# Patient Record
Sex: FEMALE | Race: WHITE | NOT HISPANIC OR LATINO | Employment: UNEMPLOYED | URBAN - NONMETROPOLITAN AREA
[De-identification: names, ages, dates, MRNs, and addresses within clinical notes are randomized per-mention and may not be internally consistent; named-entity substitution may affect disease eponyms.]

---

## 2017-01-20 RX ORDER — GABAPENTIN 400 MG/1
400 CAPSULE ORAL 3 TIMES DAILY
Qty: 90 CAPSULE | Refills: 2 | Status: SHIPPED | OUTPATIENT
Start: 2017-01-20 | End: 2017-04-11 | Stop reason: SDUPTHER

## 2017-01-20 RX ORDER — PANTOPRAZOLE SODIUM 40 MG/1
40 TABLET, DELAYED RELEASE ORAL
Qty: 30 TABLET | Refills: 6 | Status: SHIPPED | OUTPATIENT
Start: 2017-01-20 | End: 2017-06-06 | Stop reason: SDUPTHER

## 2017-02-10 RX ORDER — NYSTATIN 100000 [USP'U]/G
POWDER TOPICAL
Qty: 60 G | Refills: 3 | Status: ON HOLD | OUTPATIENT
Start: 2017-02-10 | End: 2022-09-02

## 2017-04-11 RX ORDER — GABAPENTIN 400 MG/1
CAPSULE ORAL
Qty: 90 CAPSULE | Refills: 2 | Status: SHIPPED | OUTPATIENT
Start: 2017-04-11 | End: 2017-06-06 | Stop reason: SDUPTHER

## 2017-05-08 RX ORDER — LISINOPRIL AND HYDROCHLOROTHIAZIDE 20; 12.5 MG/1; MG/1
TABLET ORAL
Qty: 90 TABLET | Refills: 3 | Status: SHIPPED | OUTPATIENT
Start: 2017-05-08 | End: 2017-06-06 | Stop reason: SDUPTHER

## 2017-06-06 ENCOUNTER — OFFICE VISIT (OUTPATIENT)
Dept: FAMILY MEDICINE CLINIC | Facility: CLINIC | Age: 57
End: 2017-06-06

## 2017-06-06 VITALS
WEIGHT: 236 LBS | HEART RATE: 65 BPM | BODY MASS INDEX: 40.51 KG/M2 | DIASTOLIC BLOOD PRESSURE: 76 MMHG | SYSTOLIC BLOOD PRESSURE: 129 MMHG | OXYGEN SATURATION: 95 % | TEMPERATURE: 97.5 F | RESPIRATION RATE: 16 BRPM

## 2017-06-06 DIAGNOSIS — I10 BENIGN ESSENTIAL HYPERTENSION: Primary | ICD-10-CM

## 2017-06-06 DIAGNOSIS — K21.00 GASTROESOPHAGEAL REFLUX DISEASE WITH ESOPHAGITIS: ICD-10-CM

## 2017-06-06 DIAGNOSIS — E78.00 PURE HYPERCHOLESTEROLEMIA: ICD-10-CM

## 2017-06-06 DIAGNOSIS — M79.2 NEURALGIA: ICD-10-CM

## 2017-06-06 PROCEDURE — 99214 OFFICE O/P EST MOD 30 MIN: CPT | Performed by: INTERNAL MEDICINE

## 2017-06-06 RX ORDER — GABAPENTIN 400 MG/1
400 CAPSULE ORAL 3 TIMES DAILY
Qty: 90 CAPSULE | Refills: 0 | Status: SHIPPED | OUTPATIENT
Start: 2017-06-06 | End: 2021-05-31 | Stop reason: HOSPADM

## 2017-06-06 RX ORDER — LISINOPRIL AND HYDROCHLOROTHIAZIDE 20; 12.5 MG/1; MG/1
1 TABLET ORAL DAILY
Qty: 90 TABLET | Refills: 3 | Status: SHIPPED | OUTPATIENT
Start: 2017-06-06 | End: 2018-09-21 | Stop reason: SDUPTHER

## 2017-06-06 RX ORDER — PANTOPRAZOLE SODIUM 40 MG/1
40 TABLET, DELAYED RELEASE ORAL
Qty: 30 TABLET | Refills: 6 | Status: SHIPPED | OUTPATIENT
Start: 2017-06-06 | End: 2021-03-12 | Stop reason: SDUPTHER

## 2017-06-06 RX ORDER — PRAVASTATIN SODIUM 80 MG/1
80 TABLET ORAL DAILY
Qty: 90 TABLET | Refills: 3 | Status: ON HOLD | OUTPATIENT
Start: 2017-06-06 | End: 2021-03-08 | Stop reason: SDUPTHER

## 2017-06-06 NOTE — PROGRESS NOTES
Subjective   Cait Lu is a 56 y.o. female.     Chief Complaint   Patient presents with   • Hyperlipidemia   • Hypertension   • Numbness   • Heartburn     Hpi:  Patient is here to follow-up on the blood pressure. She will been taking medications as prescribed. She is due for blood work. She is also here to follow up on stable reflux and neuropathy , she take gabapentin 400mg po tid  Hyperlipidemia   Pertinent negatives include no chest pain or shortness of breath.   Hypertension   Pertinent negatives include no chest pain, headaches, palpitations or shortness of breath.      The following portions of the patient's history were reviewed and updated as appropriate: allergies, current medications, past family history, past medical history, past social history, past surgical history and problem list.    Review of Systems   Constitutional: Negative for appetite change, fatigue and fever.   HENT: Negative for congestion, ear discharge, ear pain, sinus pressure and sore throat.    Eyes: Negative for pain and discharge.   Respiratory: Negative for cough, chest tightness, shortness of breath and wheezing.    Cardiovascular: Negative for chest pain, palpitations and leg swelling.   Gastrointestinal: Negative for abdominal pain, blood in stool, constipation, diarrhea and nausea.        Stable reflux   Endocrine: Negative for cold intolerance and heat intolerance.   Genitourinary: Negative for dysuria, flank pain and frequency.   Musculoskeletal: Negative for back pain and joint swelling.   Skin: Negative for color change.   Allergic/Immunologic: Negative for environmental allergies and food allergies.   Neurological: Positive for numbness. Negative for dizziness, weakness and headaches.   Hematological: Negative for adenopathy. Does not bruise/bleed easily.   Psychiatric/Behavioral: Negative for behavioral problems and dysphoric mood. The patient is not nervous/anxious.          Current Outpatient Prescriptions:    •  albuterol (PROVENTIL HFA;VENTOLIN HFA) 108 (90 BASE) MCG/ACT inhaler, Ventolin  (90 Base) MCG/ACT Inhalation Aerosol Solution; Patient Sig: Ventolin  (90 Base) MCG/ACT Inhalation Aerosol Solution INHALE 1 TO 2 PUFFS EVERY 4 TO 6 HOURS AS NEEDED.; 18; 2; 28-Apr-2014; Active, Disp: , Rfl:   •  buprenorphine-naloxone (SUBOXONE) 8-2 MG film film, Place  under the tongue., Disp: , Rfl:   •  gabapentin (NEURONTIN) 400 MG capsule, Take 1 capsule by mouth 3 (Three) Times a Day., Disp: 90 capsule, Rfl: 0  •  lisinopril-hydrochlorothiazide (PRINZIDE,ZESTORETIC) 20-12.5 MG per tablet, Take 1 tablet by mouth Daily., Disp: 90 tablet, Rfl: 3  •  nystatin (NYSTOP) 308953 UNIT/GM powder powder, APPLY 2-3 TIMES DAILY TO AFFECTED AREA(S)., Disp: 60 g, Rfl: 3  •  pantoprazole (PROTONIX) 40 MG EC tablet, Take 1 tablet by mouth Every Morning Before Breakfast. 30 minutes before breakfast, Disp: 30 tablet, Rfl: 6  •  pravastatin (PRAVACHOL) 80 MG tablet, Take 1 tablet by mouth Daily., Disp: 90 tablet, Rfl: 3  •  promethazine (PHENERGAN) 12.5 MG tablet, Take 1 tablet by mouth every 12 (twelve) hours as needed for nausea or vomiting., Disp: 60 tablet, Rfl: 1    Objective     Blood pressure 129/76, pulse 65, temperature 97.5 °F (36.4 °C), temperature source Oral, resp. rate 16, weight 236 lb (107 kg), SpO2 95 %.    Physical Exam   Constitutional: She is oriented to person, place, and time. She appears well-developed and well-nourished. No distress.   HENT:   Head: Normocephalic and atraumatic.   Right Ear: External ear normal.   Left Ear: External ear normal.   Nose: Right sinus exhibits maxillary sinus tenderness. Left sinus exhibits maxillary sinus tenderness.   Mouth/Throat: Oropharyngeal exudate present.   Eyes: Conjunctivae and EOM are normal. Pupils are equal, round, and reactive to light.   Neck: Neck supple. No thyromegaly present.   Cardiovascular: Normal rate, regular rhythm and normal heart sounds.     Pulmonary/Chest: Effort normal and breath sounds normal. No respiratory distress.   Abdominal: Soft. Bowel sounds are normal. She exhibits no distension. There is no tenderness. There is no rebound.   Musculoskeletal: Normal range of motion. She exhibits no edema.   Lymphadenopathy:     She has no cervical adenopathy.   Neurological: She is alert and oriented to person, place, and time.   No gross motor or sensory deficits   Skin: Skin is warm. She is not diaphoretic.   Psychiatric: She has a normal mood and affect.   Nursing note and vitals reviewed.      Results for orders placed or performed during the hospital encounter of 10/31/16   Comprehensive Metabolic Panel   Result Value Ref Range    Sodium 142 137 - 145 mmol/L    Potassium 4.1 3.5 - 5.1 mmol/L    Chloride 99 98 - 107 mmol/L    CO2 34 (H) 26 - 30 mmol/L    Anion Gap 13 10 - 20 mmol/L    BUN 14 7 - 20 mg/dL    Creatinine 0.9 0.6 - 1.3 mg/dL    BUN/Creatinine Ratio 15.0 7.1 - 23.5    eGFR 65 mL/min    Glucose 97 74 - 98 mg/dL    Calcium 9.5 8.4 - 10.2 mg/dL    Total Bilirubin 0.6 0.2 - 1.3 mg/dL    AST (SGOT) 20 15 - 46 U/L    ALT (SGPT) 26 13 - 69 U/L    Total Protein 8.1 6.3 - 8.2 g/dL    Albumin 4.1 3.5 - 5.0 g/dL    A/G Ratio 1.1 1.0 - 2.0    Alkaline Phosphatase 106 38 - 126 U/L   CK   Result Value Ref Range    Creatine Kinase 31 30 - 170 U/L   Lipid Panel   Result Value Ref Range    Total Cholesterol 173 0 - 199 mg/dL    Triglycerides 157 (H) 0 - 149 mg/dL    HDL Cholesterol 51 40 - 60 mg/dL    LDL Cholesterol  91 0 - 99 mg/dL    VLDL Cholesterol 31 mg/dL   CBC & Differential   Result Value Ref Range    WBC 7.5 4.8 - 10.8 THOUS    RBC 4.45 4.20 - 5.40 m/uL    Hemoglobin 12.9 12.0 - 16.0 g/dL    Hematocrit 41 37 - 47 %    MCV 91.0 81.0 - 99.0 fL    MCH 29.0 27.0 - 31.0 uug    MCHC 31.9 30.0 - 37.0 g/dL    RDW 12.4 11.5 - 14.5 %    Platelets 245 130 - 400 THOUS    Neutrophil Rel % 60.3 37.0 - 80.0 %    Lymphocyte Rel % 27.8 10.0 - 50.0 %    Monocyte  Rel % 5.6 0.0 - 12.0 %    Eosinophil Rel % 5.0 0.0 - 7.0 %    Basophil Rel % 0.90 0.00 - 2.50 %    Immature Granulocyte Rel % 0.40 0.00 - 2.50 %    Neutrophils Absolute 4.54 2.00 - 6.90 THOUS    Lymphocytes Absolute 2.09 0.60 - 3.40 THOUS    Monocytes Absolute 0.42 0.00 - 0.90 THOUS    Eosinophils Absolute 0.38 0.00 - 0.70 THOUS    Basophils Absolute 0.07 0.00 - 0.20 THOUS    Abs Imm Gran 0.03 0.00 - 0.60 THOUS         Assessment/Plan   Cait was seen today for hyperlipidemia, hypertension, numbness and heartburn.    Diagnoses and all orders for this visit:    Benign essential hypertension    Pure hypercholesterolemia    Gastroesophageal reflux disease with esophagitis    Neuralgia    Other orders  -     gabapentin (NEURONTIN) 400 MG capsule; Take 1 capsule by mouth 3 (Three) Times a Day.  -     lisinopril-hydrochlorothiazide (PRINZIDE,ZESTORETIC) 20-12.5 MG per tablet; Take 1 tablet by mouth Daily.  -     pantoprazole (PROTONIX) 40 MG EC tablet; Take 1 tablet by mouth Every Morning Before Breakfast. 30 minutes before breakfast  -     pravastatin (PRAVACHOL) 80 MG tablet; Take 1 tablet by mouth Daily.        Plan:  1. Benign essential hypertension: Will continue current medication, low-sodium diet advise.   2.mixed hyperlipidemia: Will refill medication. Patient advised to obtain fasting CMP and lipid panel. Diet and excise counseled  3. Neuralgia: We will change Gabapentin to 400 mg every 8 hours, once we get the lab results   4. gerd : continue pantoprazole         Janie Gudino MD

## 2017-08-22 RX ORDER — PRAVASTATIN SODIUM 80 MG/1
TABLET ORAL
Qty: 90 TABLET | Refills: 3 | Status: SHIPPED | OUTPATIENT
Start: 2017-08-22 | End: 2021-03-12 | Stop reason: SDUPTHER

## 2018-09-21 RX ORDER — LISINOPRIL AND HYDROCHLOROTHIAZIDE 20; 12.5 MG/1; MG/1
1 TABLET ORAL DAILY
Qty: 30 TABLET | Refills: 0 | Status: SHIPPED | OUTPATIENT
Start: 2018-09-21 | End: 2021-03-12 | Stop reason: SDUPTHER

## 2018-11-28 RX ORDER — PANTOPRAZOLE SODIUM 40 MG/1
TABLET, DELAYED RELEASE ORAL
Qty: 30 TABLET | Refills: 6 | OUTPATIENT
Start: 2018-11-28

## 2019-04-25 RX ORDER — THERMOMETER, ELECTRONIC,ORAL
EACH MISCELLANEOUS
Qty: 2551.5 G | Refills: 6 | OUTPATIENT
Start: 2019-04-25

## 2019-04-25 RX ORDER — NYSTATIN 100000 [USP'U]/G
POWDER TOPICAL
Qty: 60 G | Refills: 3 | OUTPATIENT
Start: 2019-04-25

## 2021-03-07 ENCOUNTER — HOSPITAL ENCOUNTER (INPATIENT)
Facility: HOSPITAL | Age: 61
LOS: 4 days | Discharge: HOME | End: 2021-03-11
Attending: INTERNAL MEDICINE | Admitting: INTERNAL MEDICINE

## 2021-03-07 ENCOUNTER — APPOINTMENT (OUTPATIENT)
Dept: CT IMAGING | Facility: HOSPITAL | Age: 61
End: 2021-03-07

## 2021-03-07 ENCOUNTER — APPOINTMENT (OUTPATIENT)
Dept: GENERAL RADIOLOGY | Facility: HOSPITAL | Age: 61
End: 2021-03-07

## 2021-03-07 DIAGNOSIS — F11.20 UNCOMPLICATED OPIOID DEPENDENCE (HCC): ICD-10-CM

## 2021-03-07 DIAGNOSIS — N17.9 ACUTE KIDNEY INJURY (HCC): Primary | ICD-10-CM

## 2021-03-07 DIAGNOSIS — J44.1 ACUTE EXACERBATION OF CHRONIC OBSTRUCTIVE PULMONARY DISEASE (COPD) (HCC): ICD-10-CM

## 2021-03-07 DIAGNOSIS — F19.10 POLYSUBSTANCE ABUSE (HCC): ICD-10-CM

## 2021-03-07 DIAGNOSIS — N28.9 RENAL INSUFFICIENCY: ICD-10-CM

## 2021-03-07 DIAGNOSIS — R41.0 DELIRIUM: ICD-10-CM

## 2021-03-07 DIAGNOSIS — F15.10 METHAMPHETAMINE ABUSE (HCC): ICD-10-CM

## 2021-03-07 LAB
ALBUMIN SERPL-MCNC: 4.1 G/DL (ref 3.5–5.2)
ALBUMIN/GLOB SERPL: 1.2 G/DL
ALP SERPL-CCNC: 119 U/L (ref 39–117)
ALT SERPL W P-5'-P-CCNC: 13 U/L (ref 1–33)
AMMONIA BLD-SCNC: 52 UMOL/L (ref 11–51)
AMPHET+METHAMPHET UR QL: POSITIVE
AMPHETAMINES UR QL: POSITIVE
ANION GAP SERPL CALCULATED.3IONS-SCNC: 16.2 MMOL/L (ref 5–15)
AST SERPL-CCNC: 20 U/L (ref 1–32)
ATMOSPHERIC PRESS: 741 MMHG
BARBITURATES UR QL SCN: NEGATIVE
BASE EXCESS BLDV CALC-SCNC: -5.2 MMOL/L (ref 0–2)
BASOPHILS # BLD AUTO: 0.06 10*3/MM3 (ref 0–0.2)
BASOPHILS NFR BLD AUTO: 0.5 % (ref 0–1.5)
BDY SITE: ABNORMAL
BENZODIAZ UR QL SCN: NEGATIVE
BILIRUB SERPL-MCNC: 0.3 MG/DL (ref 0–1.2)
BILIRUB UR QL STRIP: NEGATIVE
BUN SERPL-MCNC: 77 MG/DL (ref 8–23)
BUN/CREAT SERPL: 14.5 (ref 7–25)
BUPRENORPHINE SERPL-MCNC: POSITIVE NG/ML
CALCIUM SPEC-SCNC: 9.2 MG/DL (ref 8.6–10.5)
CANNABINOIDS SERPL QL: NEGATIVE
CHLORIDE SERPL-SCNC: 101 MMOL/L (ref 98–107)
CK SERPL-CCNC: 337 U/L (ref 20–180)
CLARITY UR: CLEAR
CO2 SERPL-SCNC: 18.8 MMOL/L (ref 22–29)
COCAINE UR QL: NEGATIVE
COHGB MFR BLD: 1.5 % (ref 0–5)
COLOR UR: YELLOW
CREAT SERPL-MCNC: 5.3 MG/DL (ref 0.57–1)
CREAT UR-MCNC: 56.2 MG/DL
DEPRECATED RDW RBC AUTO: 42.9 FL (ref 37–54)
EOSINOPHIL # BLD AUTO: 0.16 10*3/MM3 (ref 0–0.4)
EOSINOPHIL NFR BLD AUTO: 1.4 % (ref 0.3–6.2)
ERYTHROCYTE [DISTWIDTH] IN BLOOD BY AUTOMATED COUNT: 13.1 % (ref 12.3–15.4)
ETHANOL BLD-MCNC: <10 MG/DL (ref 0–10)
ETHANOL UR QL: <0.01 %
GFR SERPL CREATININE-BSD FRML MDRD: 8 ML/MIN/1.73
GFR SERPL CREATININE-BSD FRML MDRD: ABNORMAL ML/MIN/{1.73_M2}
GLOBULIN UR ELPH-MCNC: 3.5 GM/DL
GLUCOSE SERPL-MCNC: 92 MG/DL (ref 65–99)
GLUCOSE UR STRIP-MCNC: NEGATIVE MG/DL
HCO3 BLDV-SCNC: 22 MMOL/L (ref 22–28)
HCT VFR BLD AUTO: 39.9 % (ref 34–46.6)
HGB BLD-MCNC: 13.3 G/DL (ref 12–15.9)
HGB UR QL STRIP.AUTO: NEGATIVE
HOLD SPECIMEN: NORMAL
IMM GRANULOCYTES # BLD AUTO: 0.06 10*3/MM3 (ref 0–0.05)
IMM GRANULOCYTES NFR BLD AUTO: 0.5 % (ref 0–0.5)
INHALED O2 CONCENTRATION: 21 %
KETONES UR QL STRIP: NEGATIVE
LEUKOCYTE ESTERASE UR QL STRIP.AUTO: NEGATIVE
LYMPHOCYTES # BLD AUTO: 1.26 10*3/MM3 (ref 0.7–3.1)
LYMPHOCYTES NFR BLD AUTO: 11.2 % (ref 19.6–45.3)
MCH RBC QN AUTO: 29.9 PG (ref 26.6–33)
MCHC RBC AUTO-ENTMCNC: 33.3 G/DL (ref 31.5–35.7)
MCV RBC AUTO: 89.7 FL (ref 79–97)
METHADONE UR QL SCN: NEGATIVE
METHGB BLD QL: 0.6 % (ref 0–3)
MODALITY: ABNORMAL
MONOCYTES # BLD AUTO: 0.6 10*3/MM3 (ref 0.1–0.9)
MONOCYTES NFR BLD AUTO: 5.3 % (ref 5–12)
MYOGLOBIN SERPL-MCNC: 179.9 NG/ML (ref 25–58)
NEUTROPHILS NFR BLD AUTO: 81.1 % (ref 42.7–76)
NEUTROPHILS NFR BLD AUTO: 9.13 10*3/MM3 (ref 1.7–7)
NITRITE UR QL STRIP: NEGATIVE
NOTE: ABNORMAL
NRBC BLD AUTO-RTO: 0 /100 WBC (ref 0–0.2)
OPIATES UR QL: NEGATIVE
OXYCODONE UR QL SCN: NEGATIVE
OXYHGB MFR BLDV: 64.4 % (ref 40–70)
PCO2 BLDV: 48.3 MM HG (ref 40–50)
PCP UR QL SCN: NEGATIVE
PH BLDV: 7.27 PH UNITS (ref 7.32–7.42)
PH UR STRIP.AUTO: <=5 [PH] (ref 5–8)
PLAT MORPH BLD: NORMAL
PLATELET # BLD AUTO: ABNORMAL 10*3/UL
PMV BLD AUTO: 10.3 FL (ref 6–12)
PO2 BLDV: 36.7 MM HG (ref 30–50)
POTASSIUM SERPL-SCNC: 5.4 MMOL/L (ref 3.5–5.2)
PROPOXYPH UR QL: NEGATIVE
PROT SERPL-MCNC: 7.6 G/DL (ref 6–8.5)
PROT UR QL STRIP: ABNORMAL
RBC # BLD AUTO: 4.45 10*6/MM3 (ref 3.77–5.28)
RBC MORPH BLD: NORMAL
SAO2 % BLDCOV: 65.8 % (ref 45–75)
SARS-COV-2 RNA PNL SPEC NAA+PROBE: NOT DETECTED
SMALL PLATELETS BLD QL SMEAR: NORMAL
SODIUM SERPL-SCNC: 136 MMOL/L (ref 136–145)
SODIUM UR-SCNC: 70 MMOL/L
SP GR UR STRIP: 1.01 (ref 1–1.03)
TRICYCLICS UR QL SCN: POSITIVE
TROPONIN T SERPL-MCNC: 0.03 NG/ML (ref 0–0.03)
UROBILINOGEN UR QL STRIP: ABNORMAL
VENTILATOR MODE: ABNORMAL
WBC # BLD AUTO: 11.27 10*3/MM3 (ref 3.4–10.8)
WBC MORPH BLD: NORMAL
WHOLE BLOOD HOLD SPECIMEN: NORMAL

## 2021-03-07 PROCEDURE — 71045 X-RAY EXAM CHEST 1 VIEW: CPT

## 2021-03-07 PROCEDURE — 87635 SARS-COV-2 COVID-19 AMP PRB: CPT | Performed by: EMERGENCY MEDICINE

## 2021-03-07 PROCEDURE — 81003 URINALYSIS AUTO W/O SCOPE: CPT | Performed by: EMERGENCY MEDICINE

## 2021-03-07 PROCEDURE — 70450 CT HEAD/BRAIN W/O DYE: CPT

## 2021-03-07 PROCEDURE — 80053 COMPREHEN METABOLIC PANEL: CPT | Performed by: EMERGENCY MEDICINE

## 2021-03-07 PROCEDURE — P9612 CATHETERIZE FOR URINE SPEC: HCPCS

## 2021-03-07 PROCEDURE — G0378 HOSPITAL OBSERVATION PER HR: HCPCS

## 2021-03-07 PROCEDURE — 80306 DRUG TEST PRSMV INSTRMNT: CPT | Performed by: EMERGENCY MEDICINE

## 2021-03-07 PROCEDURE — 84484 ASSAY OF TROPONIN QUANT: CPT | Performed by: EMERGENCY MEDICINE

## 2021-03-07 PROCEDURE — 99284 EMERGENCY DEPT VISIT MOD MDM: CPT

## 2021-03-07 PROCEDURE — 02HV33Z INSERTION OF INFUSION DEVICE INTO SUPERIOR VENA CAVA, PERCUTANEOUS APPROACH: ICD-10-PCS | Performed by: EMERGENCY MEDICINE

## 2021-03-07 PROCEDURE — 82805 BLOOD GASES W/O2 SATURATION: CPT

## 2021-03-07 PROCEDURE — 82570 ASSAY OF URINE CREATININE: CPT | Performed by: FAMILY MEDICINE

## 2021-03-07 PROCEDURE — C1751 CATH, INF, PER/CENT/MIDLINE: HCPCS

## 2021-03-07 PROCEDURE — 82820 HEMOGLOBIN-OXYGEN AFFINITY: CPT

## 2021-03-07 PROCEDURE — 25010000002 HEPARIN (PORCINE) PER 1000 UNITS: Performed by: FAMILY MEDICINE

## 2021-03-07 PROCEDURE — B548ZZA ULTRASONOGRAPHY OF SUPERIOR VENA CAVA, GUIDANCE: ICD-10-PCS | Performed by: EMERGENCY MEDICINE

## 2021-03-07 PROCEDURE — 82550 ASSAY OF CK (CPK): CPT | Performed by: EMERGENCY MEDICINE

## 2021-03-07 PROCEDURE — 82077 ASSAY SPEC XCP UR&BREATH IA: CPT | Performed by: EMERGENCY MEDICINE

## 2021-03-07 PROCEDURE — 85007 BL SMEAR W/DIFF WBC COUNT: CPT | Performed by: EMERGENCY MEDICINE

## 2021-03-07 PROCEDURE — 93005 ELECTROCARDIOGRAM TRACING: CPT | Performed by: EMERGENCY MEDICINE

## 2021-03-07 PROCEDURE — 84300 ASSAY OF URINE SODIUM: CPT | Performed by: FAMILY MEDICINE

## 2021-03-07 PROCEDURE — 99223 1ST HOSP IP/OBS HIGH 75: CPT | Performed by: FAMILY MEDICINE

## 2021-03-07 PROCEDURE — 82140 ASSAY OF AMMONIA: CPT | Performed by: FAMILY MEDICINE

## 2021-03-07 PROCEDURE — 83874 ASSAY OF MYOGLOBIN: CPT | Performed by: EMERGENCY MEDICINE

## 2021-03-07 PROCEDURE — 85025 COMPLETE CBC W/AUTO DIFF WBC: CPT | Performed by: EMERGENCY MEDICINE

## 2021-03-07 PROCEDURE — 36415 COLL VENOUS BLD VENIPUNCTURE: CPT

## 2021-03-07 RX ORDER — SODIUM CHLORIDE 0.9 % (FLUSH) 0.9 %
10 SYRINGE (ML) INJECTION AS NEEDED
Status: DISCONTINUED | OUTPATIENT
Start: 2021-03-07 | End: 2021-03-11 | Stop reason: HOSPADM

## 2021-03-07 RX ORDER — ACETAMINOPHEN 160 MG/5ML
650 SOLUTION ORAL EVERY 4 HOURS PRN
Status: DISCONTINUED | OUTPATIENT
Start: 2021-03-07 | End: 2021-03-11 | Stop reason: HOSPADM

## 2021-03-07 RX ORDER — LORAZEPAM 2 MG/ML
0.5 INJECTION INTRAMUSCULAR EVERY 4 HOURS PRN
Status: DISCONTINUED | OUTPATIENT
Start: 2021-03-07 | End: 2021-03-11 | Stop reason: HOSPADM

## 2021-03-07 RX ORDER — HEPARIN SODIUM 5000 [USP'U]/ML
5000 INJECTION, SOLUTION INTRAVENOUS; SUBCUTANEOUS EVERY 12 HOURS SCHEDULED
Status: DISCONTINUED | OUTPATIENT
Start: 2021-03-07 | End: 2021-03-11 | Stop reason: HOSPADM

## 2021-03-07 RX ORDER — CYCLOBENZAPRINE HCL 10 MG
10 TABLET ORAL 3 TIMES DAILY PRN
Status: ON HOLD | COMMUNITY
End: 2022-09-02

## 2021-03-07 RX ORDER — ONDANSETRON 4 MG/1
4 TABLET, FILM COATED ORAL EVERY 6 HOURS PRN
Status: DISCONTINUED | OUTPATIENT
Start: 2021-03-07 | End: 2021-03-11 | Stop reason: HOSPADM

## 2021-03-07 RX ORDER — ONDANSETRON 2 MG/ML
4 INJECTION INTRAMUSCULAR; INTRAVENOUS EVERY 6 HOURS PRN
Status: DISCONTINUED | OUTPATIENT
Start: 2021-03-07 | End: 2021-03-11 | Stop reason: HOSPADM

## 2021-03-07 RX ORDER — SODIUM CHLORIDE 9 MG/ML
100 INJECTION, SOLUTION INTRAVENOUS CONTINUOUS
Status: DISCONTINUED | OUTPATIENT
Start: 2021-03-07 | End: 2021-03-08

## 2021-03-07 RX ORDER — ACETAMINOPHEN 650 MG/1
650 SUPPOSITORY RECTAL EVERY 4 HOURS PRN
Status: DISCONTINUED | OUTPATIENT
Start: 2021-03-07 | End: 2021-03-11 | Stop reason: HOSPADM

## 2021-03-07 RX ORDER — QUETIAPINE FUMARATE 300 MG/1
300 TABLET, FILM COATED ORAL NIGHTLY
COMMUNITY

## 2021-03-07 RX ORDER — LORAZEPAM 0.5 MG/1
0.5 TABLET ORAL EVERY 8 HOURS PRN
Status: DISCONTINUED | OUTPATIENT
Start: 2021-03-07 | End: 2021-03-11 | Stop reason: HOSPADM

## 2021-03-07 RX ORDER — ACETAMINOPHEN 325 MG/1
650 TABLET ORAL EVERY 4 HOURS PRN
Status: DISCONTINUED | OUTPATIENT
Start: 2021-03-07 | End: 2021-03-11 | Stop reason: HOSPADM

## 2021-03-07 RX ORDER — SODIUM CHLORIDE 0.9 % (FLUSH) 0.9 %
10 SYRINGE (ML) INJECTION EVERY 12 HOURS SCHEDULED
Status: DISCONTINUED | OUTPATIENT
Start: 2021-03-07 | End: 2021-03-11 | Stop reason: HOSPADM

## 2021-03-07 RX ADMIN — SODIUM CHLORIDE 1000 ML: 9 INJECTION, SOLUTION INTRAVENOUS at 05:32

## 2021-03-07 RX ADMIN — SODIUM CHLORIDE 100 ML/HR: 9 INJECTION, SOLUTION INTRAVENOUS at 20:18

## 2021-03-07 RX ADMIN — SODIUM CHLORIDE, PRESERVATIVE FREE 10 ML: 5 INJECTION INTRAVENOUS at 10:07

## 2021-03-07 RX ADMIN — HEPARIN SODIUM 5000 UNITS: 5000 INJECTION INTRAVENOUS; SUBCUTANEOUS at 20:17

## 2021-03-07 RX ADMIN — HEPARIN SODIUM 5000 UNITS: 5000 INJECTION INTRAVENOUS; SUBCUTANEOUS at 10:15

## 2021-03-07 RX ADMIN — SODIUM CHLORIDE, PRESERVATIVE FREE 10 ML: 5 INJECTION INTRAVENOUS at 10:18

## 2021-03-07 RX ADMIN — SODIUM CHLORIDE 100 ML/HR: 9 INJECTION, SOLUTION INTRAVENOUS at 10:06

## 2021-03-07 NOTE — ED PROVIDER NOTES
Subjective   History of Present Illness    Chief Complaint: Confusion, general weakness  History of Present Illness: 60-year-old female history of opiate dependence on Suboxone, here with reported generalized weakness and confusion, arrives by EMS.  Patient is a poor historian and no other family members are present.  States that she had been reportedly confused for most of the day and was too weak to get up to go to the bathroom tonight.  No reported falls or head injury.  No reported fever sick contacts.  Patient is otherwise a poor historian.  Had recent family members passed from COVID-19.  Onset: Yesterday all day  Duration: Persistent  Exacerbating / Alleviating factors: Unknown  Associated symptoms: Unknown      Nurses Notes reviewed and agree, including vitals, allergies, social history and prior medical history.     REVIEW OF SYSTEMS: All systems reviewed and not pertinent unless noted.    Positive for: Generalized weakness, too weak to stand, confusion    Negative for: Unable to obtain negative review of systems secondary patient status on arrival, patient denies any fever cough sick contacts travel denies illicit drug use  Review of Systems    Past Medical History:   Diagnosis Date   • Abdominal pain    • Acute bronchitis with bronchospasm    • Allergic rhinitis due to pollen    • Cellulitis    • Cutaneous candidiasis    • Edema    • Former smoker    • Hypercholesterolemia    • Hypertension    • Impaired functional mobility, balance, gait, and endurance    • Joint pain of ankle and foot    • Legally blind    • Low back pain    • Neuralgia    • Sebaceous cyst    • Toe joint pain    • Urinary tract infection, acute    • Vitamin D deficiency        No Known Allergies    History reviewed. No pertinent surgical history.    Family History   Problem Relation Age of Onset   • Cancer Mother         breast   • Diabetes Mother    • Early death Mother         age 73   • Heart disease Father    • Heart disease Other         Social History     Socioeconomic History   • Marital status:      Spouse name: Not on file   • Number of children: Not on file   • Years of education: Not on file   • Highest education level: Not on file   Tobacco Use   • Smoking status: Former Smoker   Substance and Sexual Activity   • Alcohol use: No   • Drug use: No           Objective   Physical Exam    GENERAL APPEARANCE: Well developed, obese 60-year-old white female,  in no acute distress.  VITAL SIGNS: per nursing, reviewed and noted  SKIN: exposed skin with no rashes, ulcerations or petechiae.  Head: Normocephalic, atraumatic.   EYES: perrla. EOMI.  ENT: Normal voice.  Patient maintained wearing a mask throughout patient encounter due to coronavirus pandemic  LUNGS:  No increased work of breathing. No retractions.   CARDIOVASCULAR:  regular rate and rhythm, no murmurs.  Good Peripheral pulses. Good cap refill to extremities.   ABDOMEN: Soft, nontender, normal bowel sounds. No hernia. No ascites.  MUSCULOSKELETAL:  No tenderness. Full ROM. Strength and tone normal.  NEUROLOGIC: Alert, seems slightly confused. No facial droop no focal weakness, no cerebellar signs, global strength 4 out of 5 in bilateral upper and lower extremities. GCS 14.   NECK: Supple, symmetric. No tenderness, no masses. Full ROM  Back: full rom, no paraspinal spasm. No CVA tenderness.   PSYCH: appropriate affect.  : no bladder tenderness or distention, no CVA tenderness      Critical Care  Performed by: Spike Aranda DO  Authorized by: Spike Aranda DO     Critical care provider statement:     Critical care time (minutes):  30    Critical care time was exclusive of:  Separately billable procedures and treating other patients    Critical care was necessary to treat or prevent imminent or life-threatening deterioration of the following conditions: Altered mental status TANYA.    Critical care was time spent personally by me on the following activities:  Development of  treatment plan with patient or surrogate, discussions with consultants, evaluation of patient's response to treatment, examination of patient, ordering and performing treatments and interventions, ordering and review of laboratory studies, ordering and review of radiographic studies, pulse oximetry, re-evaluation of patient's condition and review of old charts         No attending physician procedures were performed on this patient.      ED Course  ED Course as of Mar 09 1942   Sun Mar 07, 2021   0509 Methamphetamine, Ur(!): Positive [PF]   0509 Amphetamine, Urine Qual(!): Positive [PF]   0509 Tricyclic Antidepressants Screen(!): Positive [PF]   0509 COVID19: Not Detected [PF]   0509 Glucose: 92 [PF]   0509 BUN(!): 77 [PF]   0509 Creatinine(!): 5.30 [PF]   0509 Sodium: 136 [PF]   0509 Potassium(!): 5.4 [PF]   0509 Chloride: 101 [PF]   0509 CO2(!): 18.8 [PF]   0509 Calcium: 9.2 [PF]   0509 Total Protein: 7.6 [PF]   0509 Albumin: 4.10 [PF]   0509 ALT (SGPT): 13 [PF]   0509 AST (SGOT): 20 [PF]   0509 Alkaline Phosphatase(!): 119 [PF]   0509 Total Bilirubin: 0.3 [PF]   0509 eGFR Non  Am(!): 8 [PF]   0509 Troponin T: 0.027 [PF]   0509 RBC morphology: Normal [PF]   0509 WBC Morphology: Normal [PF]   0509 WBC(!): 11.27 [PF]   0509 Hemoglobin: 13.3 [PF]   0509 Hematocrit: 39.9 [PF]   0509 Myoglobin(!): 179.9 [PF]   0509 Ethanol: <10 [PF]   0510 Creatine Kinase(!): 337 [PF]   0510 pH, Venous(!): 7.267 [PF]   0510 pCO2, Venous: 48.3 [PF]   0510 HCO3, Venous: 22.0 [PF]   0510 Base Excess(!): -5.2 [PF]   0510 O2 Saturation, Venous: 65.8 [PF]   0510 Leukocytes, UA: Negative [PF]   0510 Nitrite, UA: Negative [PF]   0552 CT head.     Technique: Unenhanced CT from the vertex to the skull base. Coronal reformations.     Comparison: None     Findings:     Remote right periventricular/basal ganglia lacunar infarct.     Remote lacunar infarct left caudate head.     Gray-white differentiation is otherwise preserved without acute  territorial infarct.     No hemorrhage or mass.     Sulci and CSF-containing spaces are age-appropriate.     Paranasal sinuses and mastoid air cells are clear.     Calvarium and scalp are unremarkable.     IMPRESSION:  Impression:     1. No acute intracranial abnormality.     2. Remote lacunar infarcts as described above.     Authenticated by Valentín Ramos MD on 03/07/2021 05:12:48 AM    [PF]   0552 Chest x-ray interpreted by me shows no evidence of any cardiomegaly, effusion, infiltrate, or bony abnormality.    [PF]   0607 EKG interpreted by me reveals sinus rhythm rate of 82 nonspecific T wave changes.  No ectopy no ischemic change.    [PF]      ED Course User Index  [PF] Spike Aranda, DO                                           MDM  Number of Diagnoses or Management Options     Amount and/or Complexity of Data Reviewed  Clinical lab tests: reviewed  Tests in the radiology section of CPT®: reviewed  Independent visualization of images, tracings, or specimens: yes      60-year-old female presents with generalized weakness, too weak to stand.  Work-up consistent with elevated BUN and creatinine 77 and  5.3, pH 7.267.  No obvious infectious process.  Covid negative.  CT scan of the head reveals old lacunar infarcts, no acute findings on chest x-ray, no old renal function for comparison over 5 years.  Patient denies active methamphetamine use but states she is used in the past.     Patient stated she did not want to stay but given her confusion  I feel the patient does not have the medical decision capability to refuse care at this time.  Initiated IV fluids.  Will admit the patient for renal insufficiency and generalized weakness with confusion.    I discussed with Dr. Moe, will admit  Final diagnoses:   Renal insufficiency   Delirium   Uncomplicated opioid dependence (CMS/[facility])   Methamphetamine abuse (CMS/[facility])            Spike Aranda,   03/07/21 0607       Spike Aranda,   03/09/21 1942

## 2021-03-07 NOTE — PLAN OF CARE
Goal Outcome Evaluation:  Plan of Care Reviewed With: patient  Progress: no change  Outcome Summary: Pt arrived today from ED. New onset tremors and loss of muscle control. Pt appears confused but can answer questions with a pause. Incontinent BB with reduced oral intake. CVA ruled out prior to admission. Pt admits to recreational drug use and recent loss of close family

## 2021-03-07 NOTE — H&P
[facility]   HISTORY AND PHYSICAL      Name:  Cait Lu   Age:  60 y.o.  Sex:  female  :  1960  MRN:  [id number]   Visit Number:  [id number]  Admission Date:  3/7/2021  Date Of Service:  21  Primary Care Physician:  Janie Gudino MD    Chief Complaint:     Confusion    History Of Presenting Illness:      The patient is a 60-year-old female who had presented from home via EMS for apparent weakness and confusion.  Medical history obtained from the chart includes obesity, hypertension, hyperlipidemia, former tobacco abuse, chronic opioid dependency on Suboxone, history of bronchitis.  History is obtained both from patient and ER record.  Patient is alert during visit, but disoriented at times.  She did tell me that she lost her father secondary to COVID-19 recently as well as her sister just in the last few days.  I did inquire about any illicit drug use, she admitted to methamphetamine use.  She did states she has not seen a primary care doctor in an extended period of time.  She does go to pain management clinic.  She denies smoking anymore.  She denies any history of renal failure or kidney disease.  Patient was anxious at time of my visit.    In the ER, vitals are overall stable.  CMP with a BUN of 77 and creatinine of 5.3 with potassium of 5.4.  .  White count of 11,000 with hemoglobin of 13.  Ethanol level negative.  COVID-19 testing negative.  VBG with a pH of 7.267.  Urinalysis with trace protein.  UDS positive methamphetamine, amphetamine, buprenorphine, TCA.  Chest x-ray was overall unremarkable per my interpretation.  CT scan of the head demonstrated remote lacunar infarcts, but no acute abnormality.  Due to patient's confusion and renal failure, we were asked to admit.    Review Of Systems:     General: Denies any fevers, chills or loss of consciousness.   Psychological: No history of any hallucinations and delusions.  Ophthalmic: No history of  any diplopia or transient loss of vision.  ENT: No history of sore throat, nasal congestion or ear pain.   Allergy and immunology: No history of rash or itching.  Hematological and Lymphatic: No history of neck swelling or easy bleeding.  Endocrine: No history of any recent unintentional weight gain or loss.  Respiratory: No history of cough or shortness of breath.   Cardiovascular: No history of chest pain or palpitations.   Gastrointestinal: No history of nausea, vomiting, diarrhea. Denies any abdominal pain.   Genitourinary: No history of dysuria or hematuria.  Musculoskeletal: No muscle pain. No calf pain.   Neurological: No history of any focal weakness. No loss of consciousness. Denies any numbness.  Confusion  Dermatological: No history of any redness or pruritis.     Past Medical History:    Past Medical History:   Diagnosis Date   • Abdominal pain    • Acute bronchitis with bronchospasm    • Allergic rhinitis due to pollen    • Cellulitis    • Cutaneous candidiasis    • Edema    • Former smoker    • Hypercholesterolemia    • Hypertension    • Joint pain of ankle and foot    • Legally blind    • Low back pain    • Neuralgia    • Sebaceous cyst    • Toe joint pain    • Urinary tract infection, acute    • Vitamin D deficiency        Past Surgical history:    History reviewed. No pertinent surgical history.    Social History:    Social History     Socioeconomic History   • Marital status:      Spouse name: Not on file   • Number of children: Not on file   • Years of education: Not on file   • Highest education level: Not on file   Tobacco Use   • Smoking status: Former Smoker   Substance and Sexual Activity   • Alcohol use: No   • Drug use: No       Family History:    Family History   Problem Relation Age of Onset   • Cancer Mother         breast   • Diabetes Mother    • Early death Mother         age 73   • Heart disease Father    • Heart disease Other        Allergies:      Patient has no known  allergies.    Home Medications:    Prior to Admission Medications     Prescriptions Last Dose Informant Patient Reported? Taking?    albuterol (PROVENTIL HFA;VENTOLIN HFA) 108 (90 BASE) MCG/ACT inhaler   Yes No    Ventolin  (90 Base) MCG/ACT Inhalation Aerosol Solution; Patient Sig: Ventolin  (90 Base) MCG/ACT Inhalation Aerosol Solution INHALE 1 TO 2 PUFFS EVERY 4 TO 6 HOURS AS NEEDED.; 18; 2; 28-Apr-2014; Active    buprenorphine-naloxone (SUBOXONE) 8-2 MG film film   Yes No    Place  under the tongue.    gabapentin (NEURONTIN) 400 MG capsule   No No    Take 1 capsule by mouth 3 (Three) Times a Day.    lisinopril-hydrochlorothiazide (PRINZIDE,ZESTORETIC) 20-12.5 MG per tablet   No No    Take 1 tablet by mouth Daily. Needs an appointment for additional refills    nystatin (NYSTOP) 924703 UNIT/GM powder powder   No No    APPLY 2-3 TIMES DAILY TO AFFECTED AREA(S).    pantoprazole (PROTONIX) 40 MG EC tablet   No No    Take 1 tablet by mouth Every Morning Before Breakfast. 30 minutes before breakfast    pravastatin (PRAVACHOL) 80 MG tablet   No No    Take 1 tablet by mouth Daily.    pravastatin (PRAVACHOL) 80 MG tablet   No No    TAKE 1 TABLET BY MOUTH DAILY.    promethazine (PHENERGAN) 12.5 MG tablet   No No    Take 1 tablet by mouth every 12 (twelve) hours as needed for nausea or vomiting.             ED Medications:    Medications   sodium chloride 0.9 % flush 10 mL (has no administration in time range)   sodium chloride 0.9 % bolus 1,000 mL (1,000 mL Intravenous New Bag 3/7/21 0532)       Vital Signs:    Temp:  [98.3 °F (36.8 °C)] 98.3 °F (36.8 °C)  Heart Rate:  [75-89] 79  Resp:  [19] 19  BP: ()/(58-73) 134/65        03/07/21  0323   Weight: 109 kg (240 lb)       Body mass index is 41.2 kg/m².    Physical Exam:    General Appearance:  Alert and cooperative, not in any acute distress.  Anxious, restless   Head:  Atraumatic and normocephalic, without obvious abnormality.   Eyes:          Pupil  slightly dilated, reactive, conjunctivae and sclerae normal, no Icterus. No pallor. Extraocular movements are within normal limits.   Ears:  Ears appear intact with no abnormalities noted.   Throat: No oral lesions, no thrush, oral mucosa moist.   Neck: Supple, trachea midline, no thyromegaly, no carotid bruit.   Back:   No tenderness to palpation, range of motion normal.   Lungs:   Breath sounds heard bilaterally equally.  No crackles or wheezing. No pleural rub or bronchial breathing.   Heart:  Normal S1 and S2, no murmur, no gallop, no rub. No JVD.   Abdomen:   Normal bowel sounds, no masses, no organomegaly. Soft, nontender, nondistended, no guarding, no rebound tenderness.  Obese abdomen   Extremities: Moves all extremities well, no edema, no cyanosis, no clubbing.   Pulses: Pulses palpable and equal bilaterally.   Skin: No bleeding, bruising or rash.  Dry skin   Neurologic:  Alert, oriented to person only.. Moves all four limbs equally. No tremors. No facial asymmetry.     Laboratory data:    I have reviewed the labs done in the emergency room.    Results from last 7 days   Lab Units 03/07/21  0427   SODIUM mmol/L 136   POTASSIUM mmol/L 5.4*   CHLORIDE mmol/L 101   CO2 mmol/L 18.8*   BUN mg/dL 77*   CREATININE mg/dL 5.30*   CALCIUM mg/dL 9.2   BILIRUBIN mg/dL 0.3   ALK PHOS U/L 119*   ALT (SGPT) U/L 13   AST (SGOT) U/L 20   GLUCOSE mg/dL 92     Results from last 7 days   Lab Units 03/07/21  0427   WBC 10*3/mm3 11.27*   HEMOGLOBIN g/dL 13.3   HEMATOCRIT % 39.9         Results from last 7 days   Lab Units 03/07/21  0427   CK TOTAL U/L 337*   TROPONIN T ng/mL 0.027                     Results from last 7 days   Lab Units 03/07/21  0332   COLOR UA  Yellow   GLUCOSE UA  Negative   KETONES UA  Negative   LEUKOCYTES UA  Negative   PH, URINE  <=5.0   BILIRUBIN UA  Negative   UROBILINOGEN UA  0.2 E.U./dL     Pain Management Panel     Pain Management Panel Latest Ref Rng & Units 3/7/2021    AMPHETAMINES SCREEN, URINE  Negative Positive(A)    BARBITURATES SCREEN Negative Negative    BENZODIAZEPINE SCREEN, URINE Negative Negative    BUPRENORPHINEUR Negative Positive(A)    COCAINE SCREEN, URINE Negative Negative    METHADONE SCREEN, URINE Negative Negative    METHAMPHETAMINEUR Negative Positive(A)              EKG:      Sinus rhythm, normal rate, normal QT interval, no acute ST or T wave abnormalities.    Radiology:    Imaging Results (Last 72 Hours)     Procedure Component Value Units Date/Time    XR Chest 1 View [[id number]] Collected: 03/07/21 0623     Updated: 03/07/21 0626    Narrative:      PROCEDURE: XR CHEST 1 VW-     HISTORY: AMS protocol        COMPARISON: None.     FINDINGS:  The heart size is normal. The mediastinum is normal. No acute  pulmonary abnormality is identified. There is no pneumothorax. The bony  thorax in intact.       Impression:      No acute cardiopulmonary process.           This report was finalized on 3/7/2021 6:24 AM by Jason Krishna MD.    CT Head Without Contrast [[id number]] Collected: 03/07/21 0512     Updated: 03/07/21 0514    Narrative:      FINAL REPORT    TECHNIQUE:  null    CLINICAL HISTORY:  general weakness, confused    COMPARISON:  null    FINDINGS:  CT head.    Technique: Unenhanced CT from the vertex to the skull base. Coronal reformations.    Comparison: None    Findings:    Remote right periventricular/basal ganglia lacunar infarct.    Remote lacunar infarct left caudate head.    Gray-white differentiation is otherwise preserved without acute territorial infarct.    No hemorrhage or mass.    Sulci and CSF-containing spaces are age-appropriate.    Paranasal sinuses and mastoid air cells are clear.    Calvarium and scalp are unremarkable.      Impression:      Impression:    1. No acute intracranial abnormality.    2. Remote lacunar infarcts as described above.    Authenticated by Valentín Ramos MD on 03/07/2021 05:12:48 AM            Renal insufficiency      Assessment:    1.  Acute  metabolic encephalopathy, present on admission  2.  Suspected acute kidney injury, most recent labs in system from 5 years ago with normal creatinine at 0.8  3.  Polysubstance abuse with positive methamphetamine/amphetamine UDS  4.  History of chronic opioid dependency on Suboxone  5.  History of tobacco abuse  6.  Remote lacunar infarcts noted on CT scan  7.  Morbid obesity  8.  Prior history of hypertension and dyslipidemia    Plan:    Patient will be admitted for observation due to degree of renal failure in addition to encephalopathy.  Did add ammonia level to labs this morning.  I suspect patient has had increased illicit drug use related to grief/stress associated with losing 2 family members within the last month.  We will give gentle saline and monitor urine output and renal function.  As needed sedatives, avoid as much as possible.  Consider restarting Suboxone within the first 24 hours to avoid opioid withdrawal.  VT prophylaxis with heparin.  Case management consult.  May need behavioral health consult as well.    Patient otherwise meets observation level of care with anticipated stay less than 2 midnights.  Further plan of care will be depend upon improvement in clinical condition.  Full code    DVT prophylaxis: Heparin  Diet: Regular  CODE STATUS: Full  Discharge disposition: 1 to 2 days back to home    Advance Care Planning   ACP discussion was held with the patient during this visit. Patient does not have an advance directive, declines further assistance.    Amina Moe DO  03/07/21  06:34 EST    Dictated utilizing Dragon dictation.

## 2021-03-07 NOTE — SIGNIFICANT NOTE
Pt is unable to sit or stand without assist x 2. Pt was left in bed, upper siderails up x2 lower siderail Left was also up. Pt was found by technician Hailey, lying on the floor next to the bed unable to get up. Pt was being cared for by technician Hailey and myself trying to replace pt telemetry monitor. Pt was not alone in the room for more than 5 minutes prior to the fall. Pt was examined on the floor by myself, no reports of pain by pt and pupils remained equal, round and re-ative to light. A skin abrasion was noted on pt Right elbow that was cleansed. Scant drainage noted. Left open to air. Pt was assisted back into the bed with assist x 2. House Nurse Supervisor Lily Dey was called by shift charge Modesta Coronado. HNS examined pt in bed and did not note any additional injuries. Dr. Guallpa was called by US Moreno and Dr. Guallpa met with the pt and myself in the room. Dr. Guallpa examined the pt and did not note any additional injuries and did not order any additional diagnostics.

## 2021-03-07 NOTE — NURSING NOTE
Pt is unable to sit or stand without assist x 2. Pt was left in bed, upper siderails up x2 lower siderail Left was also up. Pt was found by technician Hailey, lying on the floor next to the bed unable to get up. Pt was being cared for by technician Hailey and myself trying to replace pt telemetry monitor. Pt was not alone in the room for more than 5 minutes prior to the fall. Pt was examined on the floor by myself, no reports of pain by pt and pupils remained equal, round and re-ative to light. A skin abrasion was noted on pt Right elbow that was cleansed. Scant drainage noted. Left open to air. Pt was assisted back into the bed with assist x 2.

## 2021-03-07 NOTE — ED PROVIDER NOTES
Procedure note    Central Line At Bedside  Performed by: Yuval Perez DO  Authorized by: Yuval Perez DO     Consent:     Consent obtained:  Verbal    Consent given by:  Patient    Risks discussed:  Arterial puncture, incorrect placement, bleeding and infection    Alternatives discussed:  No treatment  Pre-procedure details:     Hand hygiene: Hand hygiene performed prior to insertion      Sterile barrier technique: All elements of maximal sterile technique followed      Skin preparation:  2% chlorhexidine    Skin preparation agent: Skin preparation agent completely dried prior to procedure    Anesthesia (see MAR for exact dosages):     Anesthesia method:  Local infiltration    Local anesthetic:  Lidocaine 1% w/o epi  Procedure details:     Location: Right internal jugular    Patient position:  Flat    Procedural supplies:  Triple lumen    Catheter size:  7.5 Fr    Landmarks identified: yes      Ultrasound guidance: yes      Sterile ultrasound techniques: Sterile gel and sterile probe covers were used      Number of attempts:  1    Successful placement: yes    Post-procedure details:     Post-procedure:  Dressing applied    Assessment:  Blood return through all ports    Patient tolerance of procedure:  Tolerated well, no immediate complications         Yuval Perez DO  03/07/21 0759

## 2021-03-08 LAB
ANION GAP SERPL CALCULATED.3IONS-SCNC: 13.3 MMOL/L (ref 5–15)
BUN SERPL-MCNC: 35 MG/DL (ref 8–23)
BUN/CREAT SERPL: 24.6 (ref 7–25)
CALCIUM SPEC-SCNC: 9.2 MG/DL (ref 8.6–10.5)
CHLORIDE SERPL-SCNC: 110 MMOL/L (ref 98–107)
CO2 SERPL-SCNC: 20.7 MMOL/L (ref 22–29)
CREAT SERPL-MCNC: 1.42 MG/DL (ref 0.57–1)
DEPRECATED RDW RBC AUTO: 41.2 FL (ref 37–54)
ERYTHROCYTE [DISTWIDTH] IN BLOOD BY AUTOMATED COUNT: 12.8 % (ref 12.3–15.4)
GFR SERPL CREATININE-BSD FRML MDRD: 38 ML/MIN/1.73
GLUCOSE SERPL-MCNC: 100 MG/DL (ref 65–99)
HCT VFR BLD AUTO: 35.9 % (ref 34–46.6)
HGB BLD-MCNC: 12.2 G/DL (ref 12–15.9)
MCH RBC QN AUTO: 29.9 PG (ref 26.6–33)
MCHC RBC AUTO-ENTMCNC: 34 G/DL (ref 31.5–35.7)
MCV RBC AUTO: 88 FL (ref 79–97)
PLATELET # BLD AUTO: 212 10*3/MM3 (ref 140–450)
PMV BLD AUTO: 9.6 FL (ref 6–12)
POTASSIUM SERPL-SCNC: 4.3 MMOL/L (ref 3.5–5.2)
RBC # BLD AUTO: 4.08 10*6/MM3 (ref 3.77–5.28)
SODIUM SERPL-SCNC: 144 MMOL/L (ref 136–145)
WBC # BLD AUTO: 8.25 10*3/MM3 (ref 3.4–10.8)

## 2021-03-08 PROCEDURE — 25010000002 HEPARIN (PORCINE) PER 1000 UNITS: Performed by: FAMILY MEDICINE

## 2021-03-08 PROCEDURE — 25010000002 ZIPRASIDONE MESYLATE PER 10 MG: Performed by: FAMILY MEDICINE

## 2021-03-08 PROCEDURE — 85027 COMPLETE CBC AUTOMATED: CPT | Performed by: FAMILY MEDICINE

## 2021-03-08 PROCEDURE — 97166 OT EVAL MOD COMPLEX 45 MIN: CPT

## 2021-03-08 PROCEDURE — 97161 PT EVAL LOW COMPLEX 20 MIN: CPT

## 2021-03-08 PROCEDURE — 63710000001 ONDANSETRON PER 8 MG: Performed by: FAMILY MEDICINE

## 2021-03-08 PROCEDURE — 25010000002 LORAZEPAM PER 2 MG: Performed by: FAMILY MEDICINE

## 2021-03-08 PROCEDURE — G0378 HOSPITAL OBSERVATION PER HR: HCPCS

## 2021-03-08 PROCEDURE — 80048 BASIC METABOLIC PNL TOTAL CA: CPT | Performed by: FAMILY MEDICINE

## 2021-03-08 RX ORDER — HALOPERIDOL 5 MG/ML
0.5 INJECTION INTRAMUSCULAR EVERY 6 HOURS PRN
Status: DISCONTINUED | OUTPATIENT
Start: 2021-03-08 | End: 2021-03-11 | Stop reason: HOSPADM

## 2021-03-08 RX ORDER — TIZANIDINE 4 MG/1
4 TABLET ORAL EVERY 8 HOURS PRN
COMMUNITY

## 2021-03-08 RX ORDER — EZETIMIBE 10 MG/1
10 TABLET ORAL DAILY
COMMUNITY
End: 2021-03-12 | Stop reason: SDUPTHER

## 2021-03-08 RX ORDER — FLUTICASONE PROPIONATE 50 MCG
2 SPRAY, SUSPENSION (ML) NASAL DAILY
COMMUNITY

## 2021-03-08 RX ORDER — BUPROPION HYDROCHLORIDE 200 MG/1
200 TABLET, EXTENDED RELEASE ORAL DAILY
COMMUNITY

## 2021-03-08 RX ORDER — BUPRENORPHINE AND NALOXONE 8; 2 MG/1; MG/1
1 FILM, SOLUBLE BUCCAL; SUBLINGUAL DAILY
Status: DISCONTINUED | OUTPATIENT
Start: 2021-03-08 | End: 2021-03-11 | Stop reason: HOSPADM

## 2021-03-08 RX ORDER — LEVOCETIRIZINE DIHYDROCHLORIDE 5 MG/1
5 TABLET, FILM COATED ORAL EVERY EVENING
COMMUNITY

## 2021-03-08 RX ORDER — CLONIDINE HYDROCHLORIDE 0.1 MG/1
0.1 TABLET ORAL ONCE
Status: COMPLETED | OUTPATIENT
Start: 2021-03-08 | End: 2021-03-08

## 2021-03-08 RX ORDER — CHLORAL HYDRATE 500 MG
2000 CAPSULE ORAL 2 TIMES DAILY WITH MEALS
COMMUNITY

## 2021-03-08 RX ORDER — MORPHINE SULFATE 4 MG/ML
4 INJECTION, SOLUTION INTRAMUSCULAR; INTRAVENOUS ONCE
Status: DISCONTINUED | OUTPATIENT
Start: 2021-03-08 | End: 2021-03-08

## 2021-03-08 RX ORDER — SODIUM CHLORIDE, SODIUM LACTATE, POTASSIUM CHLORIDE, CALCIUM CHLORIDE 600; 310; 30; 20 MG/100ML; MG/100ML; MG/100ML; MG/100ML
100 INJECTION, SOLUTION INTRAVENOUS CONTINUOUS
Status: ACTIVE | OUTPATIENT
Start: 2021-03-08 | End: 2021-03-10

## 2021-03-08 RX ORDER — ZIPRASIDONE MESYLATE 20 MG/ML
20 INJECTION, POWDER, LYOPHILIZED, FOR SOLUTION INTRAMUSCULAR ONCE
Status: COMPLETED | OUTPATIENT
Start: 2021-03-08 | End: 2021-03-08

## 2021-03-08 RX ORDER — NORTRIPTYLINE HYDROCHLORIDE 10 MG/1
10 CAPSULE ORAL NIGHTLY
COMMUNITY
End: 2022-09-05 | Stop reason: HOSPADM

## 2021-03-08 RX ADMIN — HEPARIN SODIUM 5000 UNITS: 5000 INJECTION INTRAVENOUS; SUBCUTANEOUS at 09:03

## 2021-03-08 RX ADMIN — CLONIDINE HYDROCHLORIDE 0.1 MG: 0.1 TABLET ORAL at 09:03

## 2021-03-08 RX ADMIN — HEPARIN SODIUM 5000 UNITS: 5000 INJECTION INTRAVENOUS; SUBCUTANEOUS at 20:41

## 2021-03-08 RX ADMIN — ZIPRASIDONE MESYLATE 20 MG: 20 INJECTION, POWDER, LYOPHILIZED, FOR SOLUTION INTRAMUSCULAR at 02:45

## 2021-03-08 RX ADMIN — SODIUM CHLORIDE, PRESERVATIVE FREE 10 ML: 5 INJECTION INTRAVENOUS at 20:43

## 2021-03-08 RX ADMIN — SODIUM CHLORIDE, POTASSIUM CHLORIDE, SODIUM LACTATE AND CALCIUM CHLORIDE 100 ML/HR: 600; 310; 30; 20 INJECTION, SOLUTION INTRAVENOUS at 16:17

## 2021-03-08 RX ADMIN — BUPRENORPHINE AND NALOXONE 1 FILM: 8; 2 FILM BUCCAL; SUBLINGUAL at 09:03

## 2021-03-08 RX ADMIN — SODIUM CHLORIDE 100 ML/HR: 9 INJECTION, SOLUTION INTRAVENOUS at 14:36

## 2021-03-08 RX ADMIN — LORAZEPAM 0.5 MG: 2 INJECTION, SOLUTION INTRAMUSCULAR; INTRAVENOUS at 00:31

## 2021-03-08 RX ADMIN — ONDANSETRON HYDROCHLORIDE 4 MG: 4 TABLET, FILM COATED ORAL at 20:57

## 2021-03-08 NOTE — PROGRESS NOTES
"Discharge Planning Assessment  [facility]     Patient Name: Cait Lu  MRN: [id number]  Today's Date: 3/8/2021    Admit Date: 3/7/2021    Discharge Needs Assessment     Row Name 03/08/21 1416       Living Environment    Lives With  other (see comments) Pt stated that she lives with her cousin, Eloise Beltrán.    Current Living Arrangements  home/apartment/condo    Primary Care Provided by  self    Provides Primary Care For  no one    Quality of Family Relationships  supportive       Transition Planning    Transportation Anticipated  car, drives self       Discharge Needs Assessment    Equipment Currently Used at Home  none        Discharge Plan     Row Name 03/08/21 1419       Plan    Plan Comments SW met with pt at [facility] to complete discharge planning. Pt reports that she lives with her cousin, Eloise Beltrán. SW verified pt's home address. Pt stated that she has access to transportation and is able to drive. Pt reports that she was able to \"get around good\" by herself at home. Pt stated that she has never had any HH and reports having no DME. Pt reports that her PCP is Janie Gudino. Pt was falling asleep and stated that she was tired, so SW left pt to sleep. Pt identified no CM needs at this time. CM will continue to follow and assist as needed.      Row Name 03/08/21 1328            Demographic Summary     Row Name 03/08/21 1406       General Information    Admission Type  observation    Arrived From  home Transported via EMS.    Referral Source  admission list    Reason for Consult  discharge planning    Preferred Language  English        Functional Status     Row Name 03/08/21 1409       Functional Status    Usual Activity Tolerance  moderate       Functional Status, IADL    Medications  independent    Meal Preparation  independent    Housekeeping  independent    Laundry  independent    Shopping  independent    IADL Comments  Pt reports that she is independent at home and \"gets around " "good\".       Employment/    Employment Status  unemployed        Psychosocial     Row Name 03/08/21 1412       Intellectual Performance WDL    Level of Consciousness  -- Pt was to be falling asleep. Pt reported being tired.       Coping/Stress    Patient Personal Strengths  strong support system    Sources of Support  other family members Pt reported her cousin Eloise Beltrán is a support. Pt stated that Claudine Dixon is also a support.       Developmental Stage (Eriksson's)    Developmental Stage  Stage 7 (35-65 years/Middle Adulthood) Generativity vs. Stagnation        Abuse/Neglect    No documentation.       Legal    No documentation.       Substance Abuse    No documentation.       Patient Forms    No documentation.           KARISHMA Hall    "

## 2021-03-08 NOTE — PLAN OF CARE
Goal Outcome Evaluation:  Plan of Care Reviewed With: patient  Progress: no change  Outcome Summary: No acute events overnight. Patient has been very restless and fidegity through out the night. She did remove her own Tripple Lumen Deep Line from her right IJ. VSS have remained stable. No events on telemetry through the night. Continue to monitor.

## 2021-03-08 NOTE — PROGRESS NOTES
"      [facility] HOSPITALIST    PROGRESS NOTE    Name:  Cait Lu   Age:  60 y.o.  Sex:  female  :  1960  MRN:  [id number]   Visit Number:  [id number]  Admission Date:  3/7/2021  Date Of Service:  21  Primary Care Physician:  Janie Gudino MD     LOS: 0 days :  Patient Care Team:  Janie Gudino MD as PCP - General:    Chief Complaint:    Acute confusion    Subjective / Interval History:   I have seen and evaluated the patient this morning.  Chart reviewed and events noted.  Apparently admitted for increased confusion overnight.  The patient is very confused at the time of the encounter and answering all the question by \"YES\" obviously, she had been abusing methamphetamine and possibly opiates after she lost her father and sister to COVID-19.  She cannot contribute to the history but does not look to be in severe distress and does not have any neurologic manifestations.  Per nursing report, the patient was agitated and pulled her right internal jugular central line that was placed overnight as well as her peripheral IV this morning.  No other acute even ts overnight    Vital Signs:    Temp:  [97.3 °F (36.3 °C)-98.1 °F (36.7 °C)] 97.9 °F (36.6 °C)  Heart Rate:  [58-86] 86  Resp:  [16-20] 16  BP: (139-146)/(65-82) 146/75    Intake and output:    I/O last 3 completed shifts:  In:  [P.O.:80; I.V.:]  Out: 1100 [Urine:400; Other:700]  I/O this shift:  In: 40 [P.O.:40]  Out: -     Physical Examination:  General: Obese, confused, minimally conversant  Head: Atraumatic and normocephalic, without obvious abnormality  Eyes:   Conjunctivae and sclerae normal, no Icterus. No pallor  Ears:  Ears appear intact with no abnormalities noted  Throat: No oral lesions, no thrush, oral mucosa moist  Neck: Supple, trachea midline, no thyromegaly  Back:   No kyphoscoliosis present. No tenderness to palpation,   no sacral edema  Lungs: Diminished air entry bilaterally basally, no wheezing or crackles " appreciated or crackles  Heart:  Normal S1 and S2, no murmur, no gallop, No JVD, no lower extremity swelling  Abdomen:  Soft, no tenderness, no organomegaly, normal bowel sounds  Normal bowel sounds, no masses, no organomegaly. Soft, nontender, nondistended, no guarding, no rebound tenderness.  Extremities: No gross abnormalities, no clubbing, pulses palpable and equal bilaterally  Skin: No bleeding, bruising or rash, normal skin turgor and elasticity  Neurologic: Cranial nerves appear intact with no evidence of facial asymmetry, no focal neuro deficit appreciated during exam normal motor and sensory functions in all 4 extremities  Psych: Confused    Laboratory results:    Results from last 7 days   Lab Units 03/08/21  0632 03/07/21  0427   SODIUM mmol/L 144 136   POTASSIUM mmol/L 4.3 5.4*   CHLORIDE mmol/L 110* 101   CO2 mmol/L 20.7* 18.8*   BUN mg/dL 35* 77*   CREATININE mg/dL 1.42* 5.30*   CALCIUM mg/dL 9.2 9.2   BILIRUBIN mg/dL  --  0.3   ALK PHOS U/L  --  119*   ALT (SGPT) U/L  --  13   AST (SGOT) U/L  --  20   GLUCOSE mg/dL 100* 92     Results from last 7 days   Lab Units 03/08/21  0632 03/07/21  0427   WBC 10*3/mm3 8.25 11.27*   HEMOGLOBIN g/dL 12.2 13.3   HEMATOCRIT % 35.9 39.9   PLATELETS 10*3/mm3 212  --          Results from last 7 days   Lab Units 03/07/21  0427   CK TOTAL U/L 337*   TROPONIN T ng/mL 0.027               I have reviewed the patient's laboratory results.    Radiology results:    Imaging Results (Last 24 Hours)     ** No results found for the last 24 hours. **          I have reviewed the patient's radiology reports.    Medication Review:   I have reviewed the patient's active and prn medications.     Problem List:      Benign essential hypertension    Chronic pain syndrome    Peripheral vascular disease (CMS/HCC)    Acute kidney injury (CMS/HCC)    Polysubstance abuse (CMS/HCC)    Methamphetamine abuse (CMS/HCC)        Summary and Hospital course:  60 years old female patient with multiple  comorbidities including obesity, essential hypertension, dyslipidemia and chronic opiate dependency on Suboxone therapy and history of methamphetamine misuse presented to the hospital with generalized weakness and acute confusion.  She had acute renal failure with hyperkalemia likely secondary to volume depletion upon admission that improved with fluid hydration.  U tox was positive for opiates, and amphetamine    Assessment:  · Acute metabolic encephalopathy, present on admission  · Suspect opiate withdrawal  · Suspect amphetamine toxicity  · Acute renal failure with ATN, improving  · Polysubstance use disorder with positive methamphetamine/amphetamine UDS  · Chronic opioid dependency on Suboxone  · Tobacco use disorder  · Old stroke with no residual weakness on CT scan  · Obesity, BMI 39  · Essential hypertension  · Dyslipidemia    Plan:      · Given the clinical presentation and persistent acute confusion, suspect that the patient acute confusion is multifactorial: Severe dehydration, uremia, opiates withdrawal and methamphetamine toxicity.  Continue supportive care.    · Restart Suboxone just in case that this is a case of opiate withdrawal.    · Continue IV fluids for hydration  · Continue to monitor kidney f for agitation unctions, avoid nephrotoxins and adjust medication per GFR  · Will add Haldol as needed  · Might benefit from behavioral health consult prior to discharge     Checklist:  · Risk assessment: High risk  · DVT prophylaxis: Heparin  · Diet: Regular diet  · Steroids: None  · Antibiotics: None  · CODE STATUS: Full code  · IV access: Peripheral IV  · Discharge disposition: Anticipate she will need that she might need another 1 to 2 days for her mental status to improve prior to discharge.    Carla Rivera MD  03/08/21  15:09 EST    Dictated utilizing Dragon dictation.

## 2021-03-08 NOTE — PROGRESS NOTES
Continued Stay Note  [facility]     Patient Name: Cait Lu  MRN: [id number]  Today's Date: 3/8/2021    Admit Date: 3/7/2021    Discharge Plan     Row Name 03/08/21 1328       Plan    Plan  Attempted to see pt in room, but working with PT.  Cm will continue to follow.        Discharge Codes    No documentation.             Lily Haider RN

## 2021-03-08 NOTE — PLAN OF CARE
Goal Outcome Evaluation:  Plan of Care Reviewed With: patient  Progress: no change  Outcome Summary: OT eval completed. Patient presents deficits in strength, endurance, balance, mobility and ADL performance. Patient is expected to benefit from continued OT services prior to DC

## 2021-03-08 NOTE — PLAN OF CARE
Goal Outcome Evaluation:         Patient up in chair today, meds changed per MD this morning and confusion has improved somewhat this afternoon, will continue to monitor and notify MD for any issues or concerns

## 2021-03-08 NOTE — THERAPY EVALUATION
Patient Name: Cait Lu  : 1960    MRN: [id number]                              Today's Date: 3/8/2021       Admit Date: 3/7/2021    Visit Dx:     ICD-10-CM ICD-9-CM   1. Renal insufficiency  N28.9 593.9   2. Delirium  R41.0 780.09   3. Uncomplicated opioid dependence (CMS/[facility])  F11.20 304.00   4. Methamphetamine abuse (CMS/[facility])  F15.10 305.70     Patient Active Problem List   Diagnosis   • Benign essential hypertension   • Chronic pain syndrome   • Multiple-type hyperlipidemia   • Nausea and vomiting   • Acute exacerbation of chronic obstructive pulmonary disease (COPD) (CMS/[facility])   • Esophagitis, reflux   • Peripheral vascular disease (CMS/[facility])   • Vitamin B12 deficiency   • Vitamin D deficiency   • Renal insufficiency   • Acute kidney injury (CMS/[facility])   • Polysubstance abuse (CMS/[facility])   • Methamphetamine abuse (CMS/[facility])     Past Medical History:   Diagnosis Date   • Abdominal pain    • Acute bronchitis with bronchospasm    • Allergic rhinitis due to pollen    • Cellulitis    • Cutaneous candidiasis    • Edema    • Former smoker    • Hypercholesterolemia    • Hypertension    • Joint pain of ankle and foot    • Legally blind    • Low back pain    • Neuralgia    • Sebaceous cyst    • Toe joint pain    • Urinary tract infection, acute    • Vitamin D deficiency      History reviewed. No pertinent surgical history.  General Information     Row Name 21 1453          OT Time and Intention    Document Type  evaluation  -SD     Mode of Treatment  occupational therapy  -SD     Row Name 21 1453          General Information    Patient Profile Reviewed  yes  -SD     Prior Level of Function  independent:;community mobility  -SD     Existing Precautions/Restrictions  fall  -SD     Barriers to Rehab  medically complex;ineffective coping  -SD     Row Name 21 1459          Living Environment    Lives With  other (see comments) cousin  -SD     Row Name 21 1453          Home Main Entrance     Number of Stairs, Main Entrance  none  -SD     Row Name 03/08/21 1453          Stairs Within Home, Primary    Number of Stairs, Within Home, Primary  none  -SD     Row Name 03/08/21 1453          Cognition    Orientation Status (Cognition)  oriented to;person  -SD     Row Name 03/08/21 1453          Safety Issues, Functional Mobility    Safety Issues Affecting Function (Mobility)  safety precautions follow-through/compliance;safety precaution awareness;awareness of need for assistance;insight into deficits/self-awareness  -SD     Impairments Affecting Function (Mobility)  balance;cognition;coordination;endurance/activity tolerance;strength  -SD       User Key  (r) = Recorded By, (t) = Taken By, (c) = Cosigned By    Initials Name Provider Type    SD Lisa Salas OT Occupational Therapist          Mobility/ADL's     Row Name 03/08/21 1454          Bed Mobility    Bed Mobility  supine-sit  -SD     Supine-Sit [place] (Bed Mobility)  minimum assist (75% patient effort)  -SD     Bed Mobility, Safety Issues  decreased use of arms for pushing/pulling;decreased use of legs for bridging/pushing;impaired trunk control for bed mobility  -SD     Assistive Device (Bed Mobility)  bed rails;head of bed elevated  -SD     Row Name 03/08/21 1454          Transfers    Transfers  sit-stand transfer;bed-chair transfer  -SD     Bed-Chair [place] (Transfers)  minimum assist (75% patient effort)  -SD     Assistive Device (Bed-Chair Transfers)  other (see comments) gait belt, HHA  -SD     Sit-Stand [place] (Transfers)  minimum assist (75% patient effort)  -SD     Row Name 03/08/21 1454          Sit-Stand Transfer    Assistive Device (Sit-Stand Transfers)  other (see comments) gait belt, HHA  -SD     Row Name 03/08/21 1454          Functional Mobility    Functional Mobility- Ind. Level  minimum assist (75% patient effort)  -SD     Functional Mobility- Device  other (see comments) gait belt, HHA  -SD     Functional  Mobility-Distance (Feet)  2  -SD     Functional Mobility- Safety Issues  balance decreased during turns;sequencing ability decreased;step length decreased;weight-shifting ability decreased  -SD     [facility] Name 03/08/21 1454          Activities of Daily Living    BADL Assessment/Intervention  bathing;upper body dressing;lower body dressing;grooming;feeding;toileting  -SD     [facility] Name 03/08/21 1454          Bathing Assessment/Intervention    [place] Level (Bathing)  moderate assist (50% patient effort)  -SD     Row Name 03/08/21 1454          Upper Body Dressing Assessment/Training    [place] Level (Upper Body Dressing)  minimum assist (75% patient effort)  -SD     Row Name 03/08/21 1454          Lower Body Dressing Assessment/Training    [place] Level (Lower Body Dressing)  maximum assist (25% patient effort)  -SD     [facility] Name 03/08/21 1454          Grooming Assessment/Training    [place] Level (Grooming)  minimum assist (75% patient effort)  -SD     Row Name 03/08/21 1454          Self-Feeding Assessment/Training    [place] Level (Feeding)  supervision  -SD     [facility] Name 03/08/21 1454          Toileting Assessment/Training    [place] Level (Toileting)  change pad/brief;maximum assist (25% patient effort)  -SD       User Key  (r) = Recorded By, (t) = Taken By, (c) = Cosigned By    Initials Name Provider Type    Lisa Llanes OT Occupational Therapist        Obj/Interventions     [facility] Name 03/08/21 1456          Range of Motion Comprehensive    General Range of Motion  bilateral upper extremity ROM WFL  -SD     [facility] Name 03/08/21 1456          Strength Comprehensive (MMT)    General Manual Muscle Testing (MMT) Assessment  upper extremity strength deficits identified  -SD     Comment, General Manual Muscle Testing (MMT) Assessment  UB 3+/5 - 4=/5  -SD       User Key  (r) = Recorded By, (t) = Taken By, (c) = Cosigned By    Initials Name Provider Type    Lisa Llanes OT Occupational  Therapist        Goals/Plan     Row Name 03/08/21 1457          Transfer Goal 1 (OT)    Activity/Assistive Device (Transfer Goal 1, OT)  sit-to-stand/stand-to-sit;walker, rolling  -SD     [place] Level/Cues Needed (Transfer Goal 1, OT)  contact guard assist  -SD     Time Frame (Transfer Goal 1, OT)  long term goal (LTG)  -SD     Progress/Outcome (Transfer Goal 1, OT)  goal ongoing  -SD     Row Name 03/08/21 1458          Dressing Goal 1 (OT)    Activity/Device (Dressing Goal 1, OT)  lower body dressing  -SD     [place]/Cues Needed (Dressing Goal 1, OT)  minimum assist (75% or more patient effort)  -SD     Time Frame (Dressing Goal 1, OT)  2 weeks  -SD     Progress/Outcome (Dressing Goal 1, OT)  goal ongoing  -SD     Row Name 03/08/21 1458          Toileting Goal 1 (OT)    Activity/Device (Toileting Goal 1, OT)  toileting skills, all;commode, bedside without drop arms  -SD     [place] Level/Cues Needed (Toileting Goal 1, OT)  minimum assist (75% or more patient effort)  -SD     Time Frame (Toileting Goal 1, OT)  2 weeks  -SD     Progress/Outcome (Toileting Goal 1, OT)  goal ongoing  -SD     Row Name 03/08/21 1458          Strength Goal 1 (OT)    Strength Goal 1 (OT)  Patient to perform UB ther ex as tolerated  -SD     Time Frame (Strength Goal 1, OT)  long term goal (LTG)  -SD     Progress/Outcome (Strength Goal 1, OT)  goal ongoing  -SD     Row Name 03/08/21 1456          Therapy Assessment/Plan (OT)    Planned Therapy Interventions (OT)  activity tolerance training;adaptive equipment training;BADL retraining;patient/caregiver education/training;strengthening exercise;transfer/mobility retraining  -SD       User Key  (r) = Recorded By, (t) = Taken By, (c) = Cosigned By    Initials Name Provider Type    Lisa Lalnes OT Occupational Therapist        Clinical Impression     Row Name 03/08/21 1455          Pain Assessment    Additional Documentation  Pain Scale: Numbers Pre/Post-Treatment (Group)   -SD     Row Name 03/08/21 1456          Pain Scale: Numbers Pre/Post-Treatment    Pretreatment Pain Rating  0/10 - no pain  -SD     Posttreatment Pain Rating  0/10 - no pain  -SD     Row Name 03/08/21 1456          Plan of Care Review    Plan of Care Reviewed With  patient  -SD     Progress  no change  -SD     Outcome Summary  OT eval completed. Patient presents deficits in strength, endurance, balance, mobility and ADL performance. Patient is expected to benefit from continued OT services prior to DC  -SD     Row Name 03/08/21 1456          Therapy Assessment/Plan (OT)    Patient/Family Therapy Goal Statement (OT)  Increase strength and mobility  -SD     Rehab Potential (OT)  good, to achieve stated therapy goals  -SD     Criteria for Skilled Therapeutic Interventions Met (OT)  skilled treatment is necessary  -SD     Therapy Frequency (OT)  3 times/wk 5 times if indicated  -SD     Row Name 03/08/21 1456          Therapy Plan Review/Discharge Plan (OT)    Anticipated Discharge Disposition (OT)  home with assist;home with 24/7 care;home with home health  -SD     Row Name 03/08/21 1456          Positioning and Restraints    Pre-Treatment Position  in bed  -SD     Post Treatment Position  chair  -SD     In Chair  reclined;call light within reach;encouraged to call for assist;exit alarm on;notified nsg  -SD       User Key  (r) = Recorded By, (t) = Taken By, (c) = Cosigned By    Initials Name Provider Type    Lisa Llanes OT Occupational Therapist        Outcome Measures     Row Name 03/08/21 1457          How much help from another is currently needed...    Putting on and taking off regular lower body clothing?  2  -SD     Bathing (including washing, rinsing, and drying)  2  -SD     Toileting (which includes using toilet bed pan or urinal)  2  -SD     Putting on and taking off regular upper body clothing  3  -SD     Taking care of personal grooming (such as brushing teeth)  3  -SD     Eating meals  3  -SD      [facility] 6 Clicks Score (OT)  15  -SD     Row Name 03/08/21 1459          Functional Assessment    Outcome Measure Options  AM-PAC 6 Clicks Daily Activity (OT)  -SD       User Key  (r) = Recorded By, (t) = Taken By, (c) = Cosigned By    Initials Name Provider Type    SD Lisa Salas OT Occupational Therapist        Occupational Therapy Education                 Title: PT OT SLP Therapies (In Progress)     Topic: Occupational Therapy (In Progress)     Point: ADL training (Done)     Description:   Instruct learner(s) on proper safety adaptation and remediation techniques during self care or transfers.   Instruct in proper use of assistive devices.              Learning Progress Summary           Patient Acceptance, E,TB, VU by SD at 3/8/2021 1459    Comment: Benefit of OT; OT POC                   Point: Home exercise program (Not Started)     Description:   Instruct learner(s) on appropriate technique for monitoring, assisting and/or progressing therapeutic exercises/activities.              Learner Progress:  Not documented in this visit.          Point: Precautions (Not Started)     Description:   Instruct learner(s) on prescribed precautions during self-care and functional transfers.              Learner Progress:  Not documented in this visit.          Point: Body mechanics (Not Started)     Description:   Instruct learner(s) on proper positioning and spine alignment during self-care, functional mobility activities and/or exercises.              Learner Progress:  Not documented in this visit.                      User Key     Initials Effective Dates Name Provider Type Discipline    SD 03/07/18 -  Lisa Salas OT Occupational Therapist OT              OT Recommendation and Plan  Planned Therapy Interventions (OT): activity tolerance training, adaptive equipment training, BADL retraining, patient/caregiver education/training, strengthening exercise, transfer/mobility retraining  Therapy Frequency (OT): 3  times/wk (5 times if indicated)  Plan of Care Review  Plan of Care Reviewed With: patient  Progress: no change  Outcome Summary: OT eval completed. Patient presents deficits in strength, endurance, balance, mobility and ADL performance. Patient is expected to benefit from continued OT services prior to DC     Time Calculation:   Time Calculation- OT     Row Name 03/08/21 1500             Time Calculation- OT    OT Start Time  1313  -SD      OT Received On  03/08/21  -SD      OT Goal Re-Cert Due Date  03/18/21  -SD        User Key  (r) = Recorded By, (t) = Taken By, (c) = Cosigned By    Initials Name Provider Type    Lisa Llanes, OT Occupational Therapist        Therapy Charges for Today     Code Description Service Date Service Provider Modifiers Qty    [id number]  OT EVAL MOD COMPLEXITY 3 3/8/2021 Lisa Salas OT GO 1               Lisa Salas OT  3/8/2021

## 2021-03-09 LAB
ALBUMIN SERPL-MCNC: 3.5 G/DL (ref 3.5–5.2)
ALBUMIN/GLOB SERPL: 1.1 G/DL
ALP SERPL-CCNC: 97 U/L (ref 39–117)
ALT SERPL W P-5'-P-CCNC: 14 U/L (ref 1–33)
ANION GAP SERPL CALCULATED.3IONS-SCNC: 8.4 MMOL/L (ref 5–15)
AST SERPL-CCNC: 19 U/L (ref 1–32)
BASOPHILS # BLD AUTO: 0.06 10*3/MM3 (ref 0–0.2)
BASOPHILS NFR BLD AUTO: 0.7 % (ref 0–1.5)
BILIRUB SERPL-MCNC: 0.3 MG/DL (ref 0–1.2)
BUN SERPL-MCNC: 22 MG/DL (ref 8–23)
BUN/CREAT SERPL: 19.3 (ref 7–25)
CALCIUM SPEC-SCNC: 9.1 MG/DL (ref 8.6–10.5)
CHLORIDE SERPL-SCNC: 108 MMOL/L (ref 98–107)
CK SERPL-CCNC: 74 U/L (ref 20–180)
CO2 SERPL-SCNC: 27.6 MMOL/L (ref 22–29)
CREAT SERPL-MCNC: 1.14 MG/DL (ref 0.57–1)
DEPRECATED RDW RBC AUTO: 40.1 FL (ref 37–54)
EOSINOPHIL # BLD AUTO: 0.26 10*3/MM3 (ref 0–0.4)
EOSINOPHIL NFR BLD AUTO: 3 % (ref 0.3–6.2)
ERYTHROCYTE [DISTWIDTH] IN BLOOD BY AUTOMATED COUNT: 12.8 % (ref 12.3–15.4)
GFR SERPL CREATININE-BSD FRML MDRD: 48 ML/MIN/1.73
GLOBULIN UR ELPH-MCNC: 3.1 GM/DL
GLUCOSE SERPL-MCNC: 91 MG/DL (ref 65–99)
HCT VFR BLD AUTO: 34.5 % (ref 34–46.6)
HGB BLD-MCNC: 11.8 G/DL (ref 12–15.9)
IMM GRANULOCYTES # BLD AUTO: 0.2 10*3/MM3 (ref 0–0.05)
IMM GRANULOCYTES NFR BLD AUTO: 2.3 % (ref 0–0.5)
LYMPHOCYTES # BLD AUTO: 2.41 10*3/MM3 (ref 0.7–3.1)
LYMPHOCYTES NFR BLD AUTO: 27.4 % (ref 19.6–45.3)
MCH RBC QN AUTO: 29.6 PG (ref 26.6–33)
MCHC RBC AUTO-ENTMCNC: 34.2 G/DL (ref 31.5–35.7)
MCV RBC AUTO: 86.7 FL (ref 79–97)
MONOCYTES # BLD AUTO: 0.89 10*3/MM3 (ref 0.1–0.9)
MONOCYTES NFR BLD AUTO: 10.1 % (ref 5–12)
NEUTROPHILS NFR BLD AUTO: 4.97 10*3/MM3 (ref 1.7–7)
NEUTROPHILS NFR BLD AUTO: 56.5 % (ref 42.7–76)
NRBC BLD AUTO-RTO: 0 /100 WBC (ref 0–0.2)
PLATELET # BLD AUTO: 145 10*3/MM3 (ref 140–450)
PMV BLD AUTO: 10.4 FL (ref 6–12)
POTASSIUM SERPL-SCNC: 4.4 MMOL/L (ref 3.5–5.2)
PROT SERPL-MCNC: 6.6 G/DL (ref 6–8.5)
RBC # BLD AUTO: 3.98 10*6/MM3 (ref 3.77–5.28)
SODIUM SERPL-SCNC: 144 MMOL/L (ref 136–145)
WBC # BLD AUTO: 8.79 10*3/MM3 (ref 3.4–10.8)

## 2021-03-09 PROCEDURE — 85025 COMPLETE CBC W/AUTO DIFF WBC: CPT | Performed by: INTERNAL MEDICINE

## 2021-03-09 PROCEDURE — 97535 SELF CARE MNGMENT TRAINING: CPT

## 2021-03-09 PROCEDURE — 97116 GAIT TRAINING THERAPY: CPT

## 2021-03-09 PROCEDURE — 82550 ASSAY OF CK (CPK): CPT | Performed by: INTERNAL MEDICINE

## 2021-03-09 PROCEDURE — 99233 SBSQ HOSP IP/OBS HIGH 50: CPT | Performed by: INTERNAL MEDICINE

## 2021-03-09 PROCEDURE — 97110 THERAPEUTIC EXERCISES: CPT

## 2021-03-09 PROCEDURE — 25010000002 HEPARIN (PORCINE) PER 1000 UNITS: Performed by: FAMILY MEDICINE

## 2021-03-09 PROCEDURE — 80053 COMPREHEN METABOLIC PANEL: CPT | Performed by: INTERNAL MEDICINE

## 2021-03-09 RX ADMIN — BUPRENORPHINE AND NALOXONE 1 FILM: 8; 2 FILM BUCCAL; SUBLINGUAL at 09:03

## 2021-03-09 RX ADMIN — SODIUM CHLORIDE, PRESERVATIVE FREE 10 ML: 5 INJECTION INTRAVENOUS at 09:03

## 2021-03-09 RX ADMIN — SODIUM CHLORIDE, PRESERVATIVE FREE 10 ML: 5 INJECTION INTRAVENOUS at 22:18

## 2021-03-09 RX ADMIN — HEPARIN SODIUM 5000 UNITS: 5000 INJECTION INTRAVENOUS; SUBCUTANEOUS at 09:04

## 2021-03-09 RX ADMIN — HEPARIN SODIUM 5000 UNITS: 5000 INJECTION INTRAVENOUS; SUBCUTANEOUS at 22:18

## 2021-03-09 RX ADMIN — SODIUM CHLORIDE, POTASSIUM CHLORIDE, SODIUM LACTATE AND CALCIUM CHLORIDE 100 ML/HR: 600; 310; 30; 20 INJECTION, SOLUTION INTRAVENOUS at 13:44
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
## 2021-03-09 NOTE — PLAN OF CARE
Goal Outcome Evaluation:  Plan of Care Reviewed With: patient  Progress: no change  Outcome Summary: Patient participates in PT evaluation and demonstrates decreased strength, endurance, transfers, balance and gait.  She is expected to benefit from additional PT services while hospitalized and upon discharge to home with home health care.

## 2021-03-09 NOTE — THERAPY EVALUATION
Patient Name: Cait Lu  : 1960    MRN: [id number]                              Today's Date: 3/8/2021       Admit Date: 3/7/2021    Visit Dx:     ICD-10-CM ICD-9-CM   1. Renal insufficiency  N28.9 593.9   2. Delirium  R41.0 780.09   3. Uncomplicated opioid dependence (CMS/[facility])  F11.20 304.00   4. Methamphetamine abuse (CMS/[facility])  F15.10 305.70     Patient Active Problem List   Diagnosis   • Benign essential hypertension   • Chronic pain syndrome   • Multiple-type hyperlipidemia   • Nausea and vomiting   • Acute exacerbation of chronic obstructive pulmonary disease (COPD) (CMS/[facility])   • Esophagitis, reflux   • Peripheral vascular disease (CMS/[facility])   • Vitamin B12 deficiency   • Vitamin D deficiency   • Renal insufficiency   • Acute kidney injury (CMS/[facility])   • Polysubstance abuse (CMS/[facility])   • Methamphetamine abuse (CMS/[facility])     Past Medical History:   Diagnosis Date   • Abdominal pain    • Acute bronchitis with bronchospasm    • Allergic rhinitis due to pollen    • Cellulitis    • Cutaneous candidiasis    • Edema    • Former smoker    • Hypercholesterolemia    • Hypertension    • Impaired functional mobility, balance, gait, and endurance    • Joint pain of ankle and foot    • Legally blind    • Low back pain    • Neuralgia    • Sebaceous cyst    • Toe joint pain    • Urinary tract infection, acute    • Vitamin D deficiency      History reviewed. No pertinent surgical history.  General Information     Row Name 21 1255          Physical Therapy Time and Intention    Document Type  evaluation  -JR     Mode of Treatment  physical therapy  -JR     Row Name 21 1255          General Information    Patient Profile Reviewed  yes  -JR     Existing Precautions/Restrictions  fall  -JR     Barriers to Rehab  medically complex  -JR     Row Name 21 1255          Living Environment    Lives With  other (see comments) cousin  -JR     Row Name 21 1255          Home Main Entrance    Number of  Stairs, Main Entrance  none  -     Row Name 03/08/21 1255          Stairs Within Home, Primary    Number of Stairs, Within Home, Primary  none  -     Row Name 03/08/21 1255          Cognition    Orientation Status (Cognition)  oriented to;person  -     Row Name 03/08/21 1255          Safety Issues, Functional Mobility    Safety Issues Affecting Function (Mobility)  safety precautions follow-through/compliance;insight into deficits/self-awareness;awareness of need for assistance  -     Impairments Affecting Function (Mobility)  balance;cognition;coordination;endurance/activity tolerance;strength  -       User Key  (r) = Recorded By, (t) = Taken By, (c) = Cosigned By    Initials Name Provider Type    JR Whitley Chatman, PT Physical Therapist        Mobility     Row Name 03/08/21 1255          Bed Mobility    Bed Mobility  supine-sit  -JR     Supine-Sit [place] (Bed Mobility)  minimum assist (75% patient effort)  -JR     Assistive Device (Bed Mobility)  bed rails;head of bed elevated  -     Row Name 03/08/21 1255          Bed-Chair Transfer    Bed-Chair [place] (Transfers)  minimum assist (75% patient effort)  -JR     Assistive Device (Bed-Chair Transfers)  other (see comments) HHA and gait belt  -     Row Name 03/08/21 1255          Sit-Stand Transfer    Sit-Stand [place] (Transfers)  minimum assist (75% patient effort)  -JR     Assistive Device (Sit-Stand Transfers)  other (see comments) gait belt and HHA  -     Row Name 03/08/21 1255          Gait/Stairs (Locomotion)    [place] Level (Gait)  minimum assist (75% patient effort)  -JR     Assistive Device (Gait)  other (see comments) HHA and gait belt  -     Distance in Feet (Gait)  3  -JR     Deviations/Abnormal Patterns (Gait)  bilateral deviations;base of support, wide;festinating/shuffling  -JR     Bilateral Gait Deviations  forward flexed posture;heel strike decreased  -       User Key  (r) = Recorded By, (t) = Taken By, (c) =  Cosigned By    Initials Name Provider Type    Whitley Sanchez, PT Physical Therapist        Obj/Interventions     Row Name 03/08/21 1255          Range of Motion Comprehensive    General Range of Motion  bilateral lower extremity ROM WNL  -JR     Row Name 03/08/21 1255          Strength Comprehensive (MMT)    Comment, General Manual Muscle Testing (MMT) Assessment  BLE = 3+/5  -JR     Row Name 03/08/21 1255          Balance    Balance Assessment  sitting static balance;sitting dynamic balance;standing static balance;standing dynamic balance  -JR     Static Sitting Balance  WFL  -JR     Dynamic Sitting Balance  mild impairment  -JR     Static Standing Balance  mild impairment  -JR     Dynamic Standing Balance  moderate impairment  -JR       User Key  (r) = Recorded By, (t) = Taken By, (c) = Cosigned By    Initials Name Provider Type    Whitley Sanchez PT Physical Therapist        Goals/Plan     Row Name 03/08/21 1255          Bed Mobility Goal 1 (PT)    Activity/Assistive Device (Bed Mobility Goal 1, PT)  bed mobility activities, all  -JR     [place] Level/Cues Needed (Bed Mobility Goal 1, PT)  standby assist  -JR     Time Frame (Bed Mobility Goal 1, PT)  1 week  -JR     Progress/Outcomes (Bed Mobility Goal 1, PT)  goal ongoing  -     Row Name 03/08/21 1255          Transfer Goal 1 (PT)    Activity/Assistive Device (Transfer Goal 1, PT)  sit-to-stand/stand-to-sit;bed-to-chair/chair-to-bed;walker, rolling  -JR     [place] Level/Cues Needed (Transfer Goal 1, PT)  standby assist  -JR     Time Frame (Transfer Goal 1, PT)  1 week  -JR     Progress/Outcome (Transfer Goal 1, PT)  goal ongoing  -     Row Name 03/08/21 1255          Gait Training Goal 1 (PT)    Activity/Assistive Device (Gait Training Goal 1, PT)  gait (walking locomotion);assistive device use;walker, rolling  -JR     [place] Level (Gait Training Goal 1, PT)  standby assist  -JR     Distance (Gait Training Goal 1, PT)  100  -JR     Time  Frame (Gait Training Goal 1, PT)  1 week  -JR     Progress/Outcome (Gait Training Goal 1, PT)  goal ongoing  -JR     Row Name 03/08/21 1255          Patient Education Goal (PT)    Activity (Patient Education Goal, PT)  Perform BLE x 15  -JR     [place]/Cues/Accuracy (Memory Goal 2, PT)  demonstrates adequately  -JR     Time Frame (Patient Education Goal, PT)  1 week  -JR     Progress/Outcome (Patient Education Goal, PT)  goal ongoing  -JR       User Key  (r) = Recorded By, (t) = Taken By, (c) = Cosigned By    Initials Name Provider Type    Whitley Sanchez, PT Physical Therapist        Clinical Impression     Row Name 03/08/21 1255          Pain    Additional Documentation  Pain Scale: Numbers Pre/Post-Treatment (Group)  -     Row Name 03/08/21 1255          Pain Scale: Numbers Pre/Post-Treatment    Pretreatment Pain Rating  0/10 - no pain  -JR     Posttreatment Pain Rating  0/10 - no pain  -JR     Row Name 03/08/21 1255          Plan of Care Review    Plan of Care Reviewed With  patient  -JR     Progress  no change  -JR     Outcome Summary  Patient participates in PT evaluation and demonstrates decreased strength, endurance, transfers, balance and gait.  She is expected to benefit from additional PT services while hospitalized and upon discharge to home with home health care.  -JR     Row Name 03/08/21 1257          Therapy Assessment/Plan (PT)    Patient/Family Therapy Goals Statement (PT)  patient wants to go home  -JR     Rehab Potential (PT)  good, to achieve stated therapy goals  -JR     Criteria for Skilled Interventions Met (PT)  yes;meets criteria;skilled treatment is necessary  -JR     Row Name 03/08/21 1254          Positioning and Restraints    Pre-Treatment Position  in bed  -JR     Post Treatment Position  chair  -JR     In Chair  reclined;call light within reach;encouraged to call for assist;exit alarm on;notified nsg  -JR       User Key  (r) = Recorded By, (t) = Taken By, (c) = Cosigned By     Initials Name Provider Type    Whitley Sanchez, KELLIE Physical Therapist        Outcome Measures     Row Name 03/08/21 1255          How much help from another person do you currently need...    Turning from your back to your side while in flat bed without using bedrails?  3  -JR     Moving from lying on back to sitting on the side of a flat bed without bedrails?  3  -JR     Moving to and from a bed to a chair (including a wheelchair)?  3  -JR     Standing up from a chair using your arms (e.g., wheelchair, bedside chair)?  2  -JR     Climbing 3-5 steps with a railing?  1  -JR     To walk in hospital room?  2  -JR     AM-PAC 6 Clicks Score (PT)  14  -JR     Row Name 03/08/21 1255          Functional Assessment    Outcome Measure Options  AM-PAC 6 Clicks Basic Mobility (PT)  -       User Key  (r) = Recorded By, (t) = Taken By, (c) = Cosigned By    Initials Name Provider Type    Whitley Sanchez PT Physical Therapist        Physical Therapy Education                 Title: PT OT SLP Therapies (In Progress)     Topic: Physical Therapy (In Progress)     Point: Mobility training (Done)     Learning Progress Summary           Patient Acceptance, E,TB, VU by  at 3/8/2021 2010    Comment: Role of PT and POC                   Point: Home exercise program (Not Started)     Learner Progress:  Not documented in this visit.          Point: Body mechanics (Not Started)     Learner Progress:  Not documented in this visit.          Point: Precautions (Not Started)     Learner Progress:  Not documented in this visit.                      User Key     Initials Effective Dates Name Provider Type Discipline     04/03/18 -  Whitley Chatman PT Physical Therapist PT              PT Recommendation and Plan  Planned Therapy Interventions (PT): strengthening, balance training, bed mobility training, transfer training, gait training, home exercise program, patient/family education  Plan of Care Reviewed With: patient  Progress: no  change  Outcome Summary: Patient participates in PT evaluation and demonstrates decreased strength, endurance, transfers, balance and gait.  She is expected to benefit from additional PT services while hospitalized and upon discharge to home with home health care.     Time Calculation:   PT Charges     Row Name 03/08/21 2010             Time Calculation    Start Time  1255  -JR      PT Received On  03/08/21  -      PT Goal Re-Cert Due Date  03/18/21  -        User Key  (r) = Recorded By, (t) = Taken By, (c) = Cosigned By    Initials Name Provider Type    Whitley Sanchez, PT Physical Therapist        Therapy Charges for Today     Code Description Service Date Service Provider Modifiers Qty    13152696516 HC PT EVAL LOW COMPLEXITY 4 3/8/2021 Whitley Chatman, PT GP 1          PT G-Codes  Outcome Measure Options: AM-PAC 6 Clicks Daily Activity (OT)  AM-PAC 6 Clicks Score (PT): 14  AM-PAC 6 Clicks Score (OT): 15    Whitley Chatman PT  3/8/2021

## 2021-03-09 NOTE — PLAN OF CARE
Goal Outcome Evaluation:  Plan of Care Reviewed With: patient  Progress: improving  Outcome Summary: OT tx completed. Patient completed bed mobility with SBA, transfer to chair with CGA and HHA. Completed UBB with SBA, LBD with Min A, grooming tasks with S/U followed by UB resistance exercises. Continue OT POC

## 2021-03-09 NOTE — PAYOR COMM NOTE
"TO:PASSPORT  FROM:MARINO MCCLURE RN PHONE [phone]  INPT NOTIFICATION AND CLINIACLS  STARTED AS OBSERVATION CHANGED TO INPT ADMITTED 3/7/21        Cait Lu (60 y.o. Female)     Date of Birth Social Security Number Address Home Phone MRN    1960  [id number] [facility] [address] [facility] [address] [phone] [id number]    Yarsanism Marital Status          Mormon        Admission Date Admission Type Admitting Provider Attending Provider Department, Room/Bed    3/7/21 Emergency Carla Rivera MD Younis, Mohamed Ahmed, MD [facility] MED SURG  4, 403/    Discharge Date Discharge Disposition Discharge Destination                       Attending Provider: Carla Rivera MD    Allergies: No Known Allergies    Isolation: None   Infection: None   Code Status: CPR    Ht: 162.6 cm (64.02\")   Wt: 103 kg (227 lb 15.3 oz)    Admission Cmt: None   Principal Problem: None                Active Insurance as of 3/7/2021     Primary Coverage     Payor Plan Insurance Group Employer/Plan Group    [employer]ORT BY [employer] [id number]     Payor Plan Address Payor Plan Phone Number Payor Plan Fax Number Effective Dates    [address]   2021 - None Entered    [facility] [address]       Subscriber Name Subscriber Birth Date Member ID       CAIT LU 1960 [id number]                 Emergency Contacts      (Rel.) Home Phone Work Phone Mobile Phone    Claudine Metcalf [phone] -- --               History & Physical      Amina Moe DO at 21 0634              [facility] HOSPITALIST   HISTORY AND PHYSICAL      Name:  Cait Lu   Age:  60 y.o.  Sex:  female  :  1960  MRN:  [id number]   Visit Number:  [id number]  Admission Date:  3/7/2021  Date Of Service:  21  Primary Care Physician:  Janie Gudino MD    Chief Complaint:     Confusion    History Of " Presenting Illness:      The patient is a 60-year-old female who had presented from home via EMS for apparent weakness and confusion.  Medical history obtained from the chart includes obesity, hypertension, hyperlipidemia, former tobacco abuse, chronic opioid dependency on Suboxone, history of bronchitis.  History is obtained both from patient and ER record.  Patient is alert during visit, but disoriented at times.  She did tell me that she lost her father secondary to COVID-19 recently as well as her sister just in the last few days.  I did inquire about any illicit drug use, she admitted to methamphetamine use.  She did states she has not seen a primary care doctor in an extended period of time.  She does go to pain management clinic.  She denies smoking anymore.  She denies any history of renal failure or kidney disease.  Patient was anxious at time of my visit.    In the ER, vitals are overall stable.  CMP with a BUN of 77 and creatinine of 5.3 with potassium of 5.4.  .  White count of 11,000 with hemoglobin of 13.  Ethanol level negative.  COVID-19 testing negative.  VBG with a pH of 7.267.  Urinalysis with trace protein.  UDS positive methamphetamine, amphetamine, buprenorphine, TCA.  Chest x-ray was overall unremarkable per my interpretation.  CT scan of the head demonstrated remote lacunar infarcts, but no acute abnormality.  Due to patient's confusion and renal failure, we were asked to admit.    Review Of Systems:     General: Denies any fevers, chills or loss of consciousness.   Psychological: No history of any hallucinations and delusions.  Ophthalmic: No history of any diplopia or transient loss of vision.  ENT: No history of sore throat, nasal congestion or ear pain.   Allergy and immunology: No history of rash or itching.  Hematological and Lymphatic: No history of neck swelling or easy bleeding.  Endocrine: No history of any recent unintentional weight gain or loss.  Respiratory: No history of  cough or shortness of breath.   Cardiovascular: No history of chest pain or palpitations.   Gastrointestinal: No history of nausea, vomiting, diarrhea. Denies any abdominal pain.   Genitourinary: No history of dysuria or hematuria.  Musculoskeletal: No muscle pain. No calf pain.   Neurological: No history of any focal weakness. No loss of consciousness. Denies any numbness.  Confusion  Dermatological: No history of any redness or pruritis.     Past Medical History:    Past Medical History:   Diagnosis Date   • Abdominal pain    • Acute bronchitis with bronchospasm    • Allergic rhinitis due to pollen    • Cellulitis    • Cutaneous candidiasis    • Edema    • Former smoker    • Hypercholesterolemia    • Hypertension    • Joint pain of ankle and foot    • Legally blind    • Low back pain    • Neuralgia    • Sebaceous cyst    • Toe joint pain    • Urinary tract infection, acute    • Vitamin D deficiency        Past Surgical history:    History reviewed. No pertinent surgical history.    Social History:    Social History     Socioeconomic History   • Marital status:      Spouse name: Not on file   • Number of children: Not on file   • Years of education: Not on file   • Highest education level: Not on file   Tobacco Use   • Smoking status: Former Smoker   Substance and Sexual Activity   • Alcohol use: No   • Drug use: No       Family History:    Family History   Problem Relation Age of Onset   • Cancer Mother         breast   • Diabetes Mother    • Early death Mother         age 73   • Heart disease Father    • Heart disease Other        Allergies:      Patient has no known allergies.    Home Medications:    Prior to Admission Medications     Prescriptions Last Dose Informant Patient Reported? Taking?    albuterol (PROVENTIL HFA;VENTOLIN HFA) 108 (90 BASE) MCG/ACT inhaler   Yes No    Ventolin  (90 Base) MCG/ACT Inhalation Aerosol Solution; Patient Sig: Ventolin  (90 Base) MCG/ACT Inhalation Aerosol  Solution INHALE 1 TO 2 PUFFS EVERY 4 TO 6 HOURS AS NEEDED.; 18; 2; 28-Apr-2014; Active    buprenorphine-naloxone (SUBOXONE) 8-2 MG film film   Yes No    Place  under the tongue.    gabapentin (NEURONTIN) 400 MG capsule   No No    Take 1 capsule by mouth 3 (Three) Times a Day.    lisinopril-hydrochlorothiazide (PRINZIDE,ZESTORETIC) 20-12.5 MG per tablet   No No    Take 1 tablet by mouth Daily. Needs an appointment for additional refills    nystatin (NYSTOP) 190634 UNIT/GM powder powder   No No    APPLY 2-3 TIMES DAILY TO AFFECTED AREA(S).    pantoprazole (PROTONIX) 40 MG EC tablet   No No    Take 1 tablet by mouth Every Morning Before Breakfast. 30 minutes before breakfast    pravastatin (PRAVACHOL) 80 MG tablet   No No    Take 1 tablet by mouth Daily.    pravastatin (PRAVACHOL) 80 MG tablet   No No    TAKE 1 TABLET BY MOUTH DAILY.    promethazine (PHENERGAN) 12.5 MG tablet   No No    Take 1 tablet by mouth every 12 (twelve) hours as needed for nausea or vomiting.             ED Medications:    Medications   sodium chloride 0.9 % flush 10 mL (has no administration in time range)   sodium chloride 0.9 % bolus 1,000 mL (1,000 mL Intravenous New Bag 3/7/21 0532)       Vital Signs:    Temp:  [98.3 °F (36.8 °C)] 98.3 °F (36.8 °C)  Heart Rate:  [75-89] 79  Resp:  [19] 19  BP: ()/(58-73) 134/65        03/07/21  0323   Weight: 109 kg (240 lb)       Body mass index is 41.2 kg/m².    Physical Exam:    General Appearance:  Alert and cooperative, not in any acute distress.  Anxious, restless   Head:  Atraumatic and normocephalic, without obvious abnormality.   Eyes:          Pupil slightly dilated, reactive, conjunctivae and sclerae normal, no Icterus. No pallor. Extraocular movements are within normal limits.   Ears:  Ears appear intact with no abnormalities noted.   Throat: No oral lesions, no thrush, oral mucosa moist.   Neck: Supple, trachea midline, no thyromegaly, no carotid bruit.   Back:   No tenderness to  palpation, range of motion normal.   Lungs:   Breath sounds heard bilaterally equally.  No crackles or wheezing. No pleural rub or bronchial breathing.   Heart:  Normal S1 and S2, no murmur, no gallop, no rub. No JVD.   Abdomen:   Normal bowel sounds, no masses, no organomegaly. Soft, nontender, nondistended, no guarding, no rebound tenderness.  Obese abdomen   Extremities: Moves all extremities well, no edema, no cyanosis, no clubbing.   Pulses: Pulses palpable and equal bilaterally.   Skin: No bleeding, bruising or rash.  Dry skin   Neurologic:  Alert, oriented to person only.. Moves all four limbs equally. No tremors. No facial asymmetry.     Laboratory data:    I have reviewed the labs done in the emergency room.    Results from last 7 days   Lab Units 03/07/21  0427   SODIUM mmol/L 136   POTASSIUM mmol/L 5.4*   CHLORIDE mmol/L 101   CO2 mmol/L 18.8*   BUN mg/dL 77*   CREATININE mg/dL 5.30*   CALCIUM mg/dL 9.2   BILIRUBIN mg/dL 0.3   ALK PHOS U/L 119*   ALT (SGPT) U/L 13   AST (SGOT) U/L 20   GLUCOSE mg/dL 92     Results from last 7 days   Lab Units 03/07/21  0427   WBC 10*3/mm3 11.27*   HEMOGLOBIN g/dL 13.3   HEMATOCRIT % 39.9         Results from last 7 days   Lab Units 03/07/21  0427   CK TOTAL U/L 337*   TROPONIN T ng/mL 0.027                     Results from last 7 days   Lab Units 03/07/21  0332   COLOR UA  Yellow   GLUCOSE UA  Negative   KETONES UA  Negative   LEUKOCYTES UA  Negative   PH, URINE  <=5.0   BILIRUBIN UA  Negative   UROBILINOGEN UA  0.2 E.U./dL     Pain Management Panel     Pain Management Panel Latest Ref Rng & Units 3/7/2021    AMPHETAMINES SCREEN, URINE Negative Positive(A)    BARBITURATES SCREEN Negative Negative    BENZODIAZEPINE SCREEN, URINE Negative Negative    BUPRENORPHINEUR Negative Positive(A)    COCAINE SCREEN, URINE Negative Negative    METHADONE SCREEN, URINE Negative Negative    METHAMPHETAMINEUR Negative Positive(A)              EKG:      Sinus rhythm, normal rate, normal QT  interval, no acute ST or T wave abnormalities.    Radiology:    Imaging Results (Last 72 Hours)     Procedure Component Value Units Date/Time    XR Chest 1 View [[id number]] Collected: 03/07/21 0623     Updated: 03/07/21 0626    Narrative:      PROCEDURE: XR CHEST 1 VW-     HISTORY: AMS protocol        COMPARISON: None.     FINDINGS:  The heart size is normal. The mediastinum is normal. No acute  pulmonary abnormality is identified. There is no pneumothorax. The bony  thorax in intact.       Impression:      No acute cardiopulmonary process.           This report was finalized on 3/7/2021 6:24 AM by Jason Krishna MD.    CT Head Without Contrast [[id number]] Collected: 03/07/21 0512     Updated: 03/07/21 0514    Narrative:      FINAL REPORT    TECHNIQUE:  null    CLINICAL HISTORY:  general weakness, confused    COMPARISON:  null    FINDINGS:  CT head.    Technique: Unenhanced CT from the vertex to the skull base. Coronal reformations.    Comparison: None    Findings:    Remote right periventricular/basal ganglia lacunar infarct.    Remote lacunar infarct left caudate head.    Gray-white differentiation is otherwise preserved without acute territorial infarct.    No hemorrhage or mass.    Sulci and CSF-containing spaces are age-appropriate.    Paranasal sinuses and mastoid air cells are clear.    Calvarium and scalp are unremarkable.      Impression:      Impression:    1. No acute intracranial abnormality.    2. Remote lacunar infarcts as described above.    Authenticated by Valentín Ramos MD on 03/07/2021 05:12:48 AM            Renal insufficiency      Assessment:    1.  Acute metabolic encephalopathy, present on admission  2.  Suspected acute kidney injury, most recent labs in system from 5 years ago with normal creatinine at 0.8  3.  Polysubstance abuse with positive methamphetamine/amphetamine UDS  4.  History of chronic opioid dependency on Suboxone  5.  History of tobacco abuse  6.  Remote lacunar infarcts  noted on CT scan  7.  Morbid obesity  8.  Prior history of hypertension and dyslipidemia    Plan:    Patient will be admitted for observation due to degree of renal failure in addition to encephalopathy.  Did add ammonia level to labs this morning.  I suspect patient has had increased illicit drug use related to grief/stress associated with losing 2 family members within the last month.  We will give gentle saline and monitor urine output and renal function.  As needed sedatives, avoid as much as possible.  Consider restarting Suboxone within the first 24 hours to avoid opioid withdrawal.  VT prophylaxis with heparin.  Case management consult.  May need behavioral health consult as well.    Patient otherwise meets observation level of care with anticipated stay less than 2 midnights.  Further plan of care will be depend upon improvement in clinical condition.  Full code    DVT prophylaxis: Heparin  Diet: Regular  CODE STATUS: Full  Discharge disposition: 1 to 2 days back to home    Advance Care Planning   ACP discussion was held with the patient during this visit. Patient does not have an advance directive, declines further assistance.    Amina Moe DO  03/07/21  06:34 EST    Dictated utilizing Dragon dictation.    Electronically signed by Amina Moe DO at 03/07/21 0646          Emergency Department Notes      Spike Aranda DO at 03/07/21 0503          Subjective   History of Present Illness    Chief Complaint: Confusion, general weakness  History of Present Illness: 60-year-old female history of opiate dependence on Suboxone, here with reported generalized weakness and confusion, arrives by EMS.  Patient is a poor historian and no other family members are present.  States that she had been reportedly confused for most of the day and was too weak to get up to go to the bathroom tonight.  No reported falls or head injury.  No reported fever sick contacts.  Patient is otherwise a poor historian.   Had recent family members passed from COVID-19.  Onset: Yesterday all day  Duration: Persistent  Exacerbating / Alleviating factors: Unknown  Associated symptoms: Unknown      Nurses Notes reviewed and agree, including vitals, allergies, social history and prior medical history.     REVIEW OF SYSTEMS: All systems reviewed and not pertinent unless noted.    Positive for: Generalized weakness, too weak to stand, confusion    Negative for: Unable to obtain negative review of systems secondary patient status on arrival, patient denies any fever cough sick contacts travel denies illicit drug use  Review of Systems    Past Medical History:   Diagnosis Date   • Abdominal pain    • Acute bronchitis with bronchospasm    • Allergic rhinitis due to pollen    • Cellulitis    • Cutaneous candidiasis    • Edema    • Former smoker    • Hypercholesterolemia    • Hypertension    • Joint pain of ankle and foot    • Legally blind    • Low back pain    • Neuralgia    • Sebaceous cyst    • Toe joint pain    • Urinary tract infection, acute    • Vitamin D deficiency        No Known Allergies    History reviewed. No pertinent surgical history.    Family History   Problem Relation Age of Onset   • Cancer Mother         breast   • Diabetes Mother    • Early death Mother         age 73   • Heart disease Father    • Heart disease Other        Social History     Socioeconomic History   • Marital status:      Spouse name: Not on file   • Number of children: Not on file   • Years of education: Not on file   • Highest education level: Not on file   Tobacco Use   • Smoking status: Former Smoker   Substance and Sexual Activity   • Alcohol use: No   • Drug use: No           Objective   Physical Exam    GENERAL APPEARANCE: Well developed, obese 60-year-old white female,  in no acute distress.  VITAL SIGNS: per nursing, reviewed and noted  SKIN: exposed skin with no rashes, ulcerations or petechiae.  Head: Normocephalic, atraumatic.   EYES:  perrla. EOMI.  ENT: Normal voice.  Patient maintained wearing a mask throughout patient encounter due to coronavirus pandemic  LUNGS:  No increased work of breathing. No retractions.   CARDIOVASCULAR:  regular rate and rhythm, no murmurs.  Good Peripheral pulses. Good cap refill to extremities.   ABDOMEN: Soft, nontender, normal bowel sounds. No hernia. No ascites.  MUSCULOSKELETAL:  No tenderness. Full ROM. Strength and tone normal.  NEUROLOGIC: Alert, seems slightly confused. No facial droop no focal weakness, no cerebellar signs, global strength 4 out of 5 in bilateral upper and lower extremities. GCS 14.   NECK: Supple, symmetric. No tenderness, no masses. Full ROM  Back: full rom, no paraspinal spasm. No CVA tenderness.   PSYCH: appropriate affect.  : no bladder tenderness or distention, no CVA tenderness      Procedures    No attending physician procedures were performed on this patient.      ED Course  ED Course as of Mar 07 0607   Sun Mar 07, 2021   0509 Methamphetamine, Ur(!): Positive [PF]   0509 Amphetamine, Urine Qual(!): Positive [PF]   0509 Tricyclic Antidepressants Screen(!): Positive [PF]   0509 COVID19: Not Detected [PF]   0509 Glucose: 92 [PF]   0509 BUN(!): 77 [PF]   0509 Creatinine(!): 5.30 [PF]   0509 Sodium: 136 [PF]   0509 Potassium(!): 5.4 [PF]   0509 Chloride: 101 [PF]   0509 CO2(!): 18.8 [PF]   0509 Calcium: 9.2 [PF]   0509 Total Protein: 7.6 [PF]   0509 Albumin: 4.10 [PF]   0509 ALT (SGPT): 13 [PF]   0509 AST (SGOT): 20 [PF]   0509 Alkaline Phosphatase(!): 119 [PF]   0509 Total Bilirubin: 0.3 [PF]   0509 eGFR Non  Am(!): 8 [PF]   0509 Troponin T: 0.027 [PF]   0509 RBC morphology: Normal [PF]   0509 WBC Morphology: Normal [PF]   0509 WBC(!): 11.27 [PF]   0509 Hemoglobin: 13.3 [PF]   0509 Hematocrit: 39.9 [PF]   0509 Myoglobin(!): 179.9 [PF]   0509 Ethanol: <10 [PF]   0510 Creatine Kinase(!): 337 [PF]   0510 pH, Venous(!): 7.267 [PF]   0510 pCO2, Venous: 48.3 [PF]   0510 HCO3,  Venous: 22.0 [PF]   0510 Base Excess(!): -5.2 [PF]   0510 O2 Saturation, Venous: 65.8 [PF]   0510 Leukocytes, UA: Negative [PF]   0510 Nitrite, UA: Negative [PF]   0552 CT head.     Technique: Unenhanced CT from the vertex to the skull base. Coronal reformations.     Comparison: None     Findings:     Remote right periventricular/basal ganglia lacunar infarct.     Remote lacunar infarct left caudate head.     Gray-white differentiation is otherwise preserved without acute territorial infarct.     No hemorrhage or mass.     Sulci and CSF-containing spaces are age-appropriate.     Paranasal sinuses and mastoid air cells are clear.     Calvarium and scalp are unremarkable.     IMPRESSION:  Impression:     1. No acute intracranial abnormality.     2. Remote lacunar infarcts as described above.     Authenticated by Valentín Ramos MD on 03/07/2021 05:12:48 AM    [PF]   0552 Chest x-ray interpreted by me shows no evidence of any cardiomegaly, effusion, infiltrate, or bony abnormality.    [PF]      ED Course User Index  [PF] Spike Aranda W, DO                                           MDM  60-year-old female presents with generalized weakness, too weak to stand.  Work-up consistent with elevated BUN and creatinine 77 and  5.3, pH 7.267.  No obvious infectious process.  Covid negative.  CT scan of the head reveals old lacunar infarcts, no acute findings on chest x-ray, no old renal function for comparison over 5 years.  Patient denies active methamphetamine use but states she is used in the past.     Patient stated she did not want to stay but given her confusion  I feel the patient does not have the medical decision capability to refuse care at this time.  Initiated IV fluids.  Will admit the patient for renal insufficiency and generalized weakness with confusion.    I discussed with Dr. Moe, will admit  Final diagnoses:   Renal insufficiency   Delirium   Uncomplicated opioid dependence (CMS/[facility])   Methamphetamine  abuse (CMS/[facility])            Spike Aranda DO  03/07/21 0607      Electronically signed by Spike Aranda DO at 03/07/21 0607     Bo Limon at 03/07/21 0609        Per House Supervisor the patient will be going to 403     Bo Limon  03/07/21 0609      Electronically signed by Bo Limon at 03/07/21 0609     Kelli Caraballo, RN at 03/07/21 0724        MD @BS for central line placement     Kelli Caraballo, RN  03/07/21 0725      Electronically signed by Kelli Caraballo, RN at 03/07/21 0725     Yuval Perez DO at 03/07/21 0755      Summary: Procedure note/central line      Procedure note    Central Line At Bedside  Performed by: Yuval Perez DO  Authorized by: Yuval Perez DO     Consent:     Consent obtained:  Verbal    Consent given by:  Patient    Risks discussed:  Arterial puncture, incorrect placement, bleeding and infection    Alternatives discussed:  No treatment  Pre-procedure details:     Hand hygiene: Hand hygiene performed prior to insertion      Sterile barrier technique: All elements of maximal sterile technique followed      Skin preparation:  2% chlorhexidine    Skin preparation agent: Skin preparation agent completely dried prior to procedure    Anesthesia (see MAR for exact dosages):     Anesthesia method:  Local infiltration    Local anesthetic:  Lidocaine 1% w/o epi  Procedure details:     Location: Right internal jugular    Patient position:  Flat    Procedural supplies:  Triple lumen    Catheter size:  7.5 Fr    Landmarks identified: yes      Ultrasound guidance: yes      Sterile ultrasound techniques: Sterile gel and sterile probe covers were used      Number of attempts:  1    Successful placement: yes    Post-procedure details:     Post-procedure:  Dressing applied    Assessment:  Blood return through all ports    Patient tolerance of procedure:  Tolerated well, no immediate complications         Yuval Perez DO  03/07/21 0756      Electronically signed by  Yuval Perez, DO at 03/07/21 0756         Current Facility-Administered Medications   Medication Dose Route Frequency Provider Last Rate Last Admin   • acetaminophen (TYLENOL) tablet 650 mg  650 mg Oral Q4H PRN Amina Moe DO        Or   • acetaminophen (TYLENOL) 160 MG/5ML solution 650 mg  650 mg Oral Q4H PRN Amina Moe DO        Or   • acetaminophen (TYLENOL) suppository 650 mg  650 mg Rectal Q4H PRN Amina Moe DO       • buprenorphine-naloxone (SUBOXONE) 8-2 MG film 1 film  1 film Sublingual Daily Carla Rivera MD   1 film at 03/09/21 0903   • haloperidol lactate (HALDOL) injection 0.5 mg  0.5 mg Intravenous Q6H PRN Carla Rivera MD       • heparin (porcine) 5000 UNIT/ML injection 5,000 Units  5,000 Units Subcutaneous Q12H Amina Moe DO   5,000 Units at 03/09/21 0904   • influenza vac split quad (FLUZONE,FLUARIX,AFLURIA,FLULAVAL) injection 0.5 mL  0.5 mL Intramuscular During Hospitalization Amina Moe DO       • lactated ringers infusion  100 mL/hr Intravenous Continuous Carla Rivera  mL/hr at 03/09/21 0703 100 mL/hr at 03/09/21 0703   • LORazepam (ATIVAN) injection 0.5 mg  0.5 mg Intravenous Q4H PRN Amina Moe DO   0.5 mg at 03/08/21 0031   • LORazepam (ATIVAN) tablet 0.5 mg  0.5 mg Oral Q8H PRN Amina Moe DO       • ondansetron (ZOFRAN) tablet 4 mg  4 mg Oral Q6H PRN Amina Moe DO   4 mg at 03/08/21 2057    Or   • ondansetron (ZOFRAN) injection 4 mg  4 mg Intravenous Q6H PRN Amina Moe DO       • sodium chloride 0.9 % flush 10 mL  10 mL Intravenous PRN Amina Moe DO   10 mL at 03/07/21 1018   • sodium chloride 0.9 % flush 10 mL  10 mL Intravenous Q12H Amina Moe, DO   10 mL at 03/09/21 0903   • sodium chloride 0.9 % flush 10 mL  10 mL Intravenous PRN Amina Moe, DO           Lab Results (last 24 hours)     Procedure  Component Value Units Date/Time    CBC & Differential [[id number]]  (Abnormal) Collected: 03/09/21 0530    Specimen: Blood Updated: 03/09/21 0646    Narrative:      The following orders were created for panel order CBC & Differential.  Procedure                               Abnormality         Status                     ---------                               -----------         ------                     Scan Slide[[id number]]                                                                  CBC Auto Differential[[id number]]        Abnormal            Final result                 Please view results for these tests on the individual orders.    CBC Auto Differential [[id number]]  (Abnormal) Collected: 03/09/21 0530    Specimen: Blood Updated: 03/09/21 0646     WBC 8.79 10*3/mm3      RBC 3.98 10*6/mm3      Hemoglobin 11.8 g/dL      Hematocrit 34.5 %      MCV 86.7 fL      MCH 29.6 pg      MCHC 34.2 g/dL      RDW 12.8 %      RDW-SD 40.1 fl      MPV 10.4 fL      Platelets 145 10*3/mm3      Neutrophil % 56.5 %      Lymphocyte % 27.4 %      Monocyte % 10.1 %      Eosinophil % 3.0 %      Basophil % 0.7 %      Immature Grans % 2.3 %      Neutrophils, Absolute 4.97 10*3/mm3      Lymphocytes, Absolute 2.41 10*3/mm3      Monocytes, Absolute 0.89 10*3/mm3      Eosinophils, Absolute 0.26 10*3/mm3      Basophils, Absolute 0.06 10*3/mm3      Immature Grans, Absolute 0.20 10*3/mm3      nRBC 0.0 /100 WBC     Comprehensive Metabolic Panel [[id number]]  (Abnormal) Collected: 03/09/21 0601    Specimen: Blood Updated: 03/09/21 0632     Glucose 91 mg/dL      BUN 22 mg/dL      Creatinine 1.14 mg/dL      Sodium 144 mmol/L      Potassium 4.4 mmol/L      Comment: Slight hemolysis detected by analyzer. Results may be affected.        Chloride 108 mmol/L      CO2 27.6 mmol/L      Calcium 9.1 mg/dL      Total Protein 6.6 g/dL      Albumin 3.50 g/dL      ALT (SGPT) 14 U/L      AST (SGOT) 19 U/L      Comment: Slight hemolysis detected by analyzer.  "Results may be affected.        Alkaline Phosphatase 97 U/L      Total Bilirubin 0.3 mg/dL      eGFR Non African Amer 48 mL/min/1.73      Globulin 3.1 gm/dL      A/G Ratio 1.1 g/dL      BUN/Creatinine Ratio 19.3     Anion Gap 8.4 mmol/L     Narrative:      GFR Normal >60  Chronic Kidney Disease <60  Kidney Failure <15      CK [[id number]]  (Normal) Collected: 21    Specimen: Blood Updated: 21     Creatine Kinase 74 U/L            Physician Progress Notes (last 48 hours) (Notes from 21 1011 through 21 1011)      Carla Rivera MD at 21 1509                [facility]    [facility]    Name:  Cait Lu   Age:  60 y.o.  Sex:  female  :  1960  MRN:  [id number]   Visit Number:  [id number]  Admission Date:  3/7/2021  Date Of Service:  21  Primary Care Physician:  Janie Gudino MD     LOS: 0 days :  Patient Care Team:  Janie Gudino MD as PCP - General:    Chief Complaint:    Acute confusion    Subjective / Interval History:   I have seen and evaluated the patient this morning.  Chart reviewed and events noted.  Apparently admitted for increased confusion overnight.  The patient is very confused at the time of the encounter and answering all the question by \"YES\" obviously, she had been abusing methamphetamine and possibly opiates after she lost her father and sister to COVID-19.  She cannot contribute to the history but does not look to be in severe distress and does not have any neurologic manifestations.  Per nursing report, the patient was agitated and pulled her right internal jugular central line that was placed overnight as well as her peripheral IV this morning.  No other acute even ts overnight    Vital Signs:    Temp:  [97.3 °F (36.3 °C)-98.1 °F (36.7 °C)] 97.9 °F (36.6 °C)  Heart Rate:  [58-86] 86  Resp:  [16-20] 16  BP: (139-146)/(65-82) 146/75    Intake and output:    I/O last 3 completed shifts:  In:  [P.O.:80; " I.V.:1995]  Out: 1100 [Urine:400; Other:700]  I/O this shift:  In: 40 [P.O.:40]  Out: -     Physical Examination:  General: Obese, confused, minimally conversant  Head: Atraumatic and normocephalic, without obvious abnormality  Eyes:   Conjunctivae and sclerae normal, no Icterus. No pallor  Ears:  Ears appear intact with no abnormalities noted  Throat: No oral lesions, no thrush, oral mucosa moist  Neck: Supple, trachea midline, no thyromegaly  Back:   No kyphoscoliosis present. No tenderness to palpation,   no sacral edema  Lungs: Diminished air entry bilaterally basally, no wheezing or crackles appreciated or crackles  Heart:  Normal S1 and S2, no murmur, no gallop, No JVD, no lower extremity swelling  Abdomen:  Soft, no tenderness, no organomegaly, normal bowel sounds  Normal bowel sounds, no masses, no organomegaly. Soft, nontender, nondistended, no guarding, no rebound tenderness.  Extremities: No gross abnormalities, no clubbing, pulses palpable and equal bilaterally  Skin: No bleeding, bruising or rash, normal skin turgor and elasticity  Neurologic: Cranial nerves appear intact with no evidence of facial asymmetry, no focal neuro deficit appreciated during exam normal motor and sensory functions in all 4 extremities  Psych: Confused    Laboratory results:    Results from last 7 days   Lab Units 03/08/21  0632 03/07/21 0427   SODIUM mmol/L 144 136   POTASSIUM mmol/L 4.3 5.4*   CHLORIDE mmol/L 110* 101   CO2 mmol/L 20.7* 18.8*   BUN mg/dL 35* 77*   CREATININE mg/dL 1.42* 5.30*   CALCIUM mg/dL 9.2 9.2   BILIRUBIN mg/dL  --  0.3   ALK PHOS U/L  --  119*   ALT (SGPT) U/L  --  13   AST (SGOT) U/L  --  20   GLUCOSE mg/dL 100* 92     Results from last 7 days   Lab Units 03/08/21  0632 03/07/21 0427   WBC 10*3/mm3 8.25 11.27*   HEMOGLOBIN g/dL 12.2 13.3   HEMATOCRIT % 35.9 39.9   PLATELETS 10*3/mm3 212  --          Results from last 7 days   Lab Units 03/07/21 0427   CK TOTAL U/L 337*   TROPONIN T ng/mL 0.027                I have reviewed the patient's laboratory results.    Radiology results:    Imaging Results (Last 24 Hours)     ** No results found for the last 24 hours. **          I have reviewed the patient's radiology reports.    Medication Review:   I have reviewed the patient's active and prn medications.     Problem List:      Benign essential hypertension    Chronic pain syndrome    Peripheral vascular disease (CMS/HCC)    Acute kidney injury (CMS/HCC)    Polysubstance abuse (CMS/HCC)    Methamphetamine abuse (CMS/[facility])        Summary and Hospital course:  60 years old female patient with multiple comorbidities including obesity, essential hypertension, dyslipidemia and chronic opiate dependency on Suboxone therapy and history of methamphetamine misuse presented to the hospital with generalized weakness and acute confusion.  She had acute renal failure with hyperkalemia likely secondary to volume depletion upon admission that improved with fluid hydration.  U tox was positive for opiates, and amphetamine    Assessment:  · Acute metabolic encephalopathy, present on admission  · Suspect opiate withdrawal  · Suspect amphetamine toxicity  · Acute renal failure with ATN, improving  · Polysubstance use disorder with positive methamphetamine/amphetamine UDS  · Chronic opioid dependency on Suboxone  · Tobacco use disorder  · Old stroke with no residual weakness on CT scan  · Obesity, BMI 39  · Essential hypertension  · Dyslipidemia    Plan:      · Given the clinical presentation and persistent acute confusion, suspect that the patient acute confusion is multifactorial: Severe dehydration, uremia, opiates withdrawal and methamphetamine toxicity.  Continue supportive care.    · Restart Suboxone just in case that this is a case of opiate withdrawal.    · Continue IV fluids for hydration  · Continue to monitor kidney f for agitation unctions, avoid nephrotoxins and adjust medication per GFR  · Will add Haldol as needed  · Might  benefit from behavioral health consult prior to discharge     Checklist:  · Risk assessment: High risk  · DVT prophylaxis: Heparin  · Diet: Regular diet  · Steroids: None  · Antibiotics: None  · CODE STATUS: Full code  · IV access: Peripheral IV  · Discharge disposition: Anticipate she will need that she might need another 1 to 2 days for her mental status to improve prior to discharge.    Carla Rivera MD  03/08/21  15:09 EST    Dictated utilizing Dragon dictation.        Electronically signed by Carla Rivera MD at 03/08/21 1520       Consult Notes (last 48 hours) (Notes from 03/07/21 1011 through 03/09/21 1011)    No notes of this type exist for this encounter.

## 2021-03-09 NOTE — PLAN OF CARE
Goal Outcome Evaluation:  Plan of Care Reviewed With: patient     Outcome Summary: Pt rested well during shift. Pt was A&O during entire shift. VSS. No acute events.

## 2021-03-09 NOTE — CONSULTS
"Cait Lu  1960    TIME: [id number]    Is patient agreeable to admission/treatment? No    Guardian: (Must have paperwork) FAMILY MEMBER - GUARDIAN: Self    Pt Lives With: 2 cousins and aunt    Highest Level of Education: Seventh grade     Presenting Problems: (How is the patient a danger to self or others?)  Patient presented to ED via EMS for apparent weakness and confusion.  ED records indicate patient presents with opioid dependence.    Current Stressors: death of loved one, opioid addiction, and reports relatives \"stole my medicine.\"    Depression: Patient reports she has no depression at this time     Anxiety: Patient does not report a specific number only \"really anxious.\"    Previous Psychiatric Treatment: No    Last inpatient admission: N/A    Number of admissions: N/A    Last outpatient visit: N/A    Suicidal: Patient adamantly denies SI and any preparatory behaviors    Previous Attempts: no prior suicide attempts    Number of Previous Attempts: N/A    Most Recent Attempt: N/A  [place]-SUICIDE SEVERITY RATING SCALE  Psychiatric Inpatient Setting - Discharge Screener    Ask questions that are bold and underlined Discharge   Ask Questions 1 and 2 YES NO   1) Wish to be Dead:   Person endorses thoughts about a wish to be dead or not alive anymore, or wish to fall asleep and not wake up.  While you were here in the hospital, have you wished you were dead or wished you could go to sleep and not wake up?    X   2) Suicidal Thoughts:   General non-specific thoughts of wanting to end one's life/die by suicide, “I've thought about killing myself” without general thoughts of ways to kill oneself/associated methods, intent, or plan.   While you were here in the hospital, have you actually had thoughts about killing yourself?     X   If YES to 2, ask questions 3, 4, 5, and 6.  If NO to 2, go directly to question 6   3) Suicidal Thoughts with Method (without Specific Plan or Intent to Act):   Person " endorses thoughts of suicide and has thought of a least one method during the assessment period. This is different than a specific plan with time, place or method details worked out. “I thought about taking an overdose but I never made a specific plan as to when where or how I would actually do it….and I would never go through with it.”   Have you been thinking about how you might kill yourself?      4) Suicidal Intent (without Specific Plan):   Active suicidal thoughts of killing oneself and patient reports having some intent to act on such thoughts, as opposed to “I have the thoughts but I definitely will not do anything about them.”   Have you had these thoughts and had some intention of acting on them or do you have some intention of acting on them after you leave the hospital?      5) Suicide Intent with Specific Plan:   Thoughts of killing oneself with details of plan fully or partially worked out and person has some intent to carry it out.   Have you started to work out or worked out the details of how to kill yourself either for while you were here in the hospital or for after you leave the hospital? Do you intend to carry out this plan?        6) Suicide Behavior    While you were here in the hospital, have you done anything, started to do anything, or prepared to do anything to end your life?    Examples: Took pills, cut yourself, tried to hang yourself, took out pills but didn't swallow any because you changed your mind or someone took them from you, collected pills, secured a means of obtaining a gun, gave away valuables, wrote a will or suicide note, etc.   X       Family Hx of Mental Health/Substance Abuse: Patient denies any family history of mental health or substance abuse    Delusions: Patient presents with linear thought patterns     Hallucinations: Patient does not appear to be responding to internal stimuli    Mood: constricted and decreased range     Homicidal Ideations: Absent     Abuse  "History: History of physical abuse: no     Does this require reporting: N/A    Legal History / History of Violence: The patient has no significant history of legal issues.     Sleep: Good, \"I take Seroquel for sleep.\"    Appetite: Good    Current Medical Conditions: Yes    If yes, explain: Peripheral vascular disease, renal insufficiency, polysubstance abuse, COPD    Current Psychiatric Medications: Seroquel and Wellbutrin 200 mg     History of Inappropriate Sexual Behavior: No    Hopelessness: Mild    Orientation: alert and oriented to person, place, and time     Substance Abuse: Patient denies recreational drug use however she presented to ED with ingestion of methamphetamine.  Patient also received Suboxone treatment for opioid addiction.    COWS: N/A    CIWA:  N/A    Withdrawal Symptoms: N/A     History of DT's: N/A    History of Seizures: No    SUBSTANCE ABUSE HISTORY:      DRUG   PRESENT USE  Y/N   AGE @ 1ST USE    ROUTE   HOW MUCH   HOW OFTEN   HOW LONG AT THIS RATE   Date of last use/  Amount used   Nicotine   Yes  teens  vape  uncertain  daily  teens  just prior to admission   Alcohol   Patient denies use         Marijuana   Patient denies use         Benzos     Patient denies         Neurontin   Yes  uncertain  oral  400 mg  daily  uncertain    Methadone   Patient denies use         Opiates     Patient denies use         Cocaine    Patient denies use         Heroin   Patient denies use         Meth   Patient denies use however presented to ED presumptively positive for methamphetamine         Suboxone   Yes  uncertain  oral  2 mg film  daily  patient is uncertain  prior to admission     If in active addiction, do living arrangements affect recovery?:      DATA:   This therapist received a call from [facility] staff (MELANIA Michel) with orders from (Dr. Miguel MD) for a behavioral health consult.  The patient serves as her own guardian and is agreeable to speak with me.  Met with patient at bedside. Patient is not " "under 1:1 security monitoring during assessment.  Patient is a 60 year old, single, , female residing in [place], [place]. Patient currently lives with 2 cousins and 1 aunt.  Patient is unemployed, disabled.      Patient reports presenting to ED via EMS after falling down and hitting her head.  Patient reports her aunt phoned EMS after finding her in the floor unresponsive.  Patient reports \"they stole my medicine.\"  Patient reports her 2 cousins took her Suboxone.  Patient reports after taking her medications the 2 cousins then asked her to take methamphetamine.  Patient is poor historian and does not give any other significant details of ED presentation.  Patient reports her brother removed her personal items from her cousin's residence and she will be staying with a friend Negar at discharge.  Patient reports she formed an addiction to opioids after injuring her back in her mid 30s and is currently being treated with Suboxone via [facility] [place].  Patient reports being treated by a psychiatrist at [facility] just recently but cannot recall the physician's name or her diagnoses.  Patient reports medium to high level of anxiety but currently denies depression.  Patient reports her sister and father  from COVID-19 and this is particularly distressing for her.  Therapist offered patient [facility] treatment resources but she respectfully declined.    ASSESSMENT:    Therapist completed CSSRS with patient for suicide risk assessment.  The results of patient’s CSSRS suggest that patient is adamantly denying SI/HI and any preparatory behaviors.  Patient holds attention and is Cooperative with assessment.  Patient’s appearance is disheveled, unkempt.  The patient displays Slow psychomotor behavior. The patient's affect appears flat. The patient is observed to have poverty of speech of speech.   Patient observed to have Sustained eye contact. The patient's displays limited insight, with fair impulse " control and limited judgement.  While patient appears to present with some cognitive delays there are no psychiatric acute indicators which would warrant the need for any hold or inpatient admission at this time.    PLAN:    Patient would likely benefit from [facility] inpatient treatment but she is not agreeable at this time.  Therapist recommends follow-up with psychiatrist from [facility] and offered [facility] treatment resources.  Patient declined resources. Therapist also discussed the availability of emergency behavioral health services 24/7 at [facility].  Assisted patient in identifying risk factors that would indicate the need for higher level of care, such as worsening symptoms, thoughts to harm self or others and/or self harming behaviors.  Encourage patient to call 911 crisis hotlines, or present to nearest emergency department should symptoms worsen, or in any crisis situation.  Patient agreeable and voiced understanding.  Therapist informed [facility] ED treatment team members, MELANIA Michel and Dr. Rivera  of completed consult.      Nimo Louie, CSW [id number]

## 2021-03-09 NOTE — PLAN OF CARE
Goal Outcome Evaluation:  Plan of Care Reviewed With: patient     Outcome Summary: PT treatment completed this date with pt showing improvement in gait endurance, amb 100 ft with rolling walker with CGA. Pt have c/o R hip pain throughout session but this pain stayed at 5/10 throughout. Pt began LE ther ex per flowsheet. Will plant to continue with current PT POC.

## 2021-03-09 NOTE — THERAPY TREATMENT NOTE
Patient Name: Cait Lu  : 1960    MRN: [id number]                              Today's Date: 3/9/2021       Admit Date: 3/7/2021    Visit Dx:     ICD-10-CM ICD-9-CM   1. Renal insufficiency  N28.9 593.9   2. Delirium  R41.0 780.09   3. Uncomplicated opioid dependence (CMS/[facility])  F11.20 304.00   4. Methamphetamine abuse (CMS/[facility])  F15.10 305.70     Patient Active Problem List   Diagnosis   • Benign essential hypertension   • Chronic pain syndrome   • Multiple-type hyperlipidemia   • Nausea and vomiting   • Acute exacerbation of chronic obstructive pulmonary disease (COPD) (CMS/[facility])   • Esophagitis, reflux   • Peripheral vascular disease (CMS/[facility])   • Vitamin B12 deficiency   • Vitamin D deficiency   • Renal insufficiency   • Acute kidney injury (CMS/[facility])   • Polysubstance abuse (CMS/[facility])   • Methamphetamine abuse (CMS/[facility])     Past Medical History:   Diagnosis Date   • Abdominal pain    • Acute bronchitis with bronchospasm    • Allergic rhinitis due to pollen    • Cellulitis    • Cutaneous candidiasis    • Edema    • Former smoker    • Hypercholesterolemia    • Hypertension    • Impaired functional mobility, balance, gait, and endurance    • Joint pain of ankle and foot    • Legally blind    • Low back pain    • Neuralgia    • Sebaceous cyst    • Toe joint pain    • Urinary tract infection, acute    • Vitamin D deficiency      History reviewed. No pertinent surgical history.  General Information     Row Name 21 1451          Physical Therapy Time and Intention    Document Type  therapy note (daily note)  -TW     Mode of Treatment  physical therapy  -TW     Row Name 21 145          General Information    Patient Profile Reviewed  yes  -TW     Existing Precautions/Restrictions  fall  -TW     Row Name 21 145          Cognition    Orientation Status (Cognition)  oriented to;person;place;situation;verbal cues/prompts needed for orientation  -TW     Row Name 21 1454           Safety Issues, Functional Mobility    Safety Issues Affecting Function (Mobility)  insight into deficits/self-awareness;safety precaution awareness;safety precautions follow-through/compliance  -TW     Impairments Affecting Function (Mobility)  balance;coordination;endurance/activity tolerance;strength;pain  -TW     Cognitive Impairments, Mobility Safety/Performance  safety precaution awareness;safety precaution follow-through;insight into deficits/self-awareness  -TW       User Key  (r) = Recorded By, (t) = Taken By, (c) = Cosigned By    Initials Name Provider Type    TW Maribel Donis, PT Physical Therapist        Mobility     Row Name 03/09/21 1451          Bed Mobility    Bed Mobility  supine-sit;sit-supine;scooting/bridging  -TW     Scooting/Bridging [place] (Bed Mobility)  contact guard  -TW     Supine-Sit [place] (Bed Mobility)  standby assist  -TW     Sit-Supine [place] (Bed Mobility)  contact guard;verbal cues  -TW     Assistive Device (Bed Mobility)  head of bed elevated;bed rails  -TW     Comment (Bed Mobility)  assist to get RLE back into bed due to hip pain preventing pt from completing task.  -     Row Name 03/09/21 1451          Transfers    Comment (Transfers)  Cues for hand placement when using walker.  -     Row Name 03/09/21 1451          Bed-Chair Transfer    Bed-Chair [place] (Transfers)  not tested  -     Row Name 03/09/21 1451          Sit-Stand Transfer    Sit-Stand [place] (Transfers)  contact guard;verbal cues  -TW     Assistive Device (Sit-Stand Transfers)  walker, front-wheeled  -TW     Row Name 03/09/21 1451          Gait/Stairs (Locomotion)    [place] Level (Gait)  contact guard  -     Assistive Device (Gait)  walker, front-wheeled  -     Distance in Feet (Gait)  100 ft  -TW     Deviations/Abnormal Patterns (Gait)  base of support, wide;stride length decreased;gait speed decreased  -TW     Right Sided Gait Deviations  forward flexed posture;heel  strike decreased  -TW     Comment (Gait/Stairs)  Pt needed cues to use arms more to take wt off R hip to help with pain during wt bearing.  -TW       User Key  (r) = Recorded By, (t) = Taken By, (c) = Cosigned By    Initials Name Provider Type    Maribel Lopez PT Physical Therapist        Obj/Interventions     Row Name 03/09/21 1451          Motor Skills    Therapeutic Exercise  hip;knee Began LE isometric ther ex this date 1 x 10 including: glut sets, quad sets and hip add sets. Encouraged pt to perform ankle pumps as much as she could while in bed.  -TW     Row Name 03/09/21 1451          Balance    Balance Assessment  sitting static balance;sitting dynamic balance;standing static balance;standing dynamic balance  -TW     Static Sitting Balance  WFL;unsupported;sitting, edge of bed  -TW     Dynamic Sitting Balance  WFL;unsupported;sitting, edge of bed  -TW     Static Standing Balance  WFL;supported  -TW     Dynamic Standing Balance  mild impairment;supported  -TW     Comment, Balance  When first coming to sit on EOB pt did have c/o dizziness that lasted approximately 60 sec. No dizziness when coming to stand or during ambulation.  -TW       User Key  (r) = Recorded By, (t) = Taken By, (c) = Cosigned By    Initials Name Provider Type    Marbiel Lopez PT Physical Therapist        Goals/Plan    No documentation.       Clinical Impression     Row Name 03/09/21 1451          Pain    Additional Documentation  Pain Scale: Numbers Pre/Post-Treatment (Group)  -TW     Row Name 03/09/21 1451          Pain Scale: Numbers Pre/Post-Treatment    Pretreatment Pain Rating  5/10  -TW     Posttreatment Pain Rating  5/10  -TW     Pain Location - Side  Right  -TW     Pain Location  hip  -TW     Pre/Posttreatment Pain Comment  Pt stated that her R hip pain did not increase post ambulation.  -TW     Pain Intervention(s)  Ambulation/increased activity;Repositioned  -TW     Row Name 03/09/21 1451          Plan of Care Review     Plan of Care Reviewed With  patient  -TW     Outcome Summary  PT treatment completed this date with pt showing improvement in gait endurance, amb 100 ft with rolling walker with CGA. Pt have c/o R hip pain throughout session but this pain stayed at 5/10 throughout. Pt began LE ther ex per flowsheet. Will plant to continue with current PT POC.  -TW     Row Name 03/09/21 1451          Vital Signs    O2 Delivery Pre Treatment  room air  -TW     Row Name 03/09/21 1451          Positioning and Restraints    Pre-Treatment Position  in bed  -TW     Post Treatment Position  bed  -TW     In Bed  supine;call light within reach;encouraged to call for assist;side rails up x3  -TW       User Key  (r) = Recorded By, (t) = Taken By, (c) = Cosigned By    Initials Name Provider Type    Maribel Lopez PT Physical Therapist        Outcome Measures     Row Name 03/09/21 1451          How much help from another person do you currently need...    Turning from your back to your side while in flat bed without using bedrails?  3  -TW     Moving from lying on back to sitting on the side of a flat bed without bedrails?  3  -TW     Moving to and from a bed to a chair (including a wheelchair)?  3  -TW     Standing up from a chair using your arms (e.g., wheelchair, bedside chair)?  3  -TW     Climbing 3-5 steps with a railing?  2  -TW     To walk in hospital room?  3  -TW     AM-PAC 6 Clicks Score (PT)  17  -TW     Row Name 03/09/21 1451          Functional Assessment    Outcome Measure Options  AM-PAC 6 Clicks Basic Mobility (PT)  -TW       User Key  (r) = Recorded By, (t) = Taken By, (c) = Cosigned By    Initials Name Provider Type    Maribel Lopez PT Physical Therapist        Physical Therapy Education                 Title: PT OT SLP Therapies (In Progress)     Topic: Physical Therapy (In Progress)     Point: Mobility training (Done)     Learning Progress Summary           Patient Acceptance, E,D, ELIZA,JESSE by TW at 3/9/2021 1451     Comment: Pt education on LE ther ex technique including counting aloud for ismetric contraction to prevent breath holding. Pt also begain gait training with rolling walker.    Acceptance, E,TB, VU by  at 3/8/2021 2010    Comment: Role of PT and POC                   Point: Home exercise program (Done)     Learning Progress Summary           Patient Acceptance, E,D, VU,DU by  at 3/9/2021 1451    Comment: Pt education on LE ther ex technique including counting aloud for ismetric contraction to prevent breath holding. Pt also begain gait training with rolling walker.                   Point: Body mechanics (Not Started)     Learner Progress:  Not documented in this visit.          Point: Precautions (Not Started)     Learner Progress:  Not documented in this visit.                      User Key     Initials Effective Dates Name Provider Type [facility] 04/03/18 -  Whitley Chatman PT Physical Therapist PT     03/26/19 -  Maribel Donis PT Physical Therapist PT              PT Recommendation and Plan     Plan of Care Reviewed With: patient  Outcome Summary: PT treatment completed this date with pt showing improvement in gait endurance, amb 100 ft with rolling walker with CGA. Pt have c/o R hip pain throughout session but this pain stayed at 5/10 throughout. Pt began LE ther ex per flowsheet. Will plant to continue with current PT POC.     Time Calculation:   PT Charges     Row Name 03/09/21 1451             Time Calculation    Start Time  1451  -TW      Stop Time  1510  -TW      Time Calculation (min)  19 min  -TW      PT Received On  03/09/21  -TW         Timed Charges    43241 - Gait Training Minutes   19  -TW        User Key  (r) = Recorded By, (t) = Taken By, (c) = Cosigned By    Initials Name Provider Type     Maribel Donis PT Physical Therapist        Therapy Charges for Today     Code Description Service Date Service Provider Modifiers Qty    43206479140 HC GAIT TRAINING EA 15 MIN 3/9/2021 [place]  Maribel, PT GP 1          PT G-Codes  Outcome Measure Options: AM-PAC 6 Clicks Basic Mobility (PT)  AM-PAC 6 Clicks Score (PT): 17  AM-PAC 6 Clicks Score (OT): 21    Maribel Donis, KELLIE  3/9/2021

## 2021-03-09 NOTE — THERAPY TREATMENT NOTE
Patient Name: Cait Lu  : 1960    MRN: [id number]                              Today's Date: 3/9/2021       Admit Date: 3/7/2021    Visit Dx:     ICD-10-CM ICD-9-CM   1. Renal insufficiency  N28.9 593.9   2. Delirium  R41.0 780.09   3. Uncomplicated opioid dependence (CMS/[facility])  F11.20 304.00   4. Methamphetamine abuse (CMS/[facility])  F15.10 305.70     Patient Active Problem List   Diagnosis   • Benign essential hypertension   • Chronic pain syndrome   • Multiple-type hyperlipidemia   • Nausea and vomiting   • Acute exacerbation of chronic obstructive pulmonary disease (COPD) (CMS/[facility])   • Esophagitis, reflux   • Peripheral vascular disease (CMS/[facility])   • Vitamin B12 deficiency   • Vitamin D deficiency   • Renal insufficiency   • Acute kidney injury (CMS/[facility])   • Polysubstance abuse (CMS/[facility])   • Methamphetamine abuse (CMS/[facility])     Past Medical History:   Diagnosis Date   • Abdominal pain    • Acute bronchitis with bronchospasm    • Allergic rhinitis due to pollen    • Cellulitis    • Cutaneous candidiasis    • Edema    • Former smoker    • Hypercholesterolemia    • Hypertension    • Impaired functional mobility, balance, gait, and endurance    • Joint pain of ankle and foot    • Legally blind    • Low back pain    • Neuralgia    • Sebaceous cyst    • Toe joint pain    • Urinary tract infection, acute    • Vitamin D deficiency      History reviewed. No pertinent surgical history.  General Information     Row Name 21 1048          OT Time and Intention    Document Type  therapy note (daily note)  -SD     Mode of Treatment  occupational therapy  -SD       User Key  (r) = Recorded By, (t) = Taken By, (c) = Cosigned By    Initials Name Provider Type    SD Lisa Salas OT Occupational Therapist          Mobility/ADL's     Row Name 21 1048          Bed Mobility    Bed Mobility  supine-sit  -SD     Supine-Sit [place] (Bed Mobility)  standby assist  -SD     Assistive Device (Bed  Mobility)  bed rails;head of bed elevated  -SD     Row Name 03/09/21 1048          Transfers    Transfers  sit-stand transfer;bed-chair transfer  -SD     Bed-Chair [place] (Transfers)  contact guard  -SD     Assistive Device (Bed-Chair Transfers)  other (see comments) HHA  -SD     Sit-Stand [place] (Transfers)  contact guard  -SD     Row Name 03/09/21 1048          Sit-Stand Transfer    Assistive Device (Sit-Stand Transfers)  other (see comments) HHA  -SD     Row Name 03/09/21 1048          Bathing Assessment/Intervention    [place] Level (Bathing)  upper body;upper extremities;chest/trunk;standby assist  -[facility] Name 03/09/21 1048          Lower Body Dressing Assessment/Training    [place] Level (Lower Body Dressing)  don;pants/bottoms;socks;minimum assist (75% patient effort)  -SD     [facility] Name 03/09/21 1048          Grooming Assessment/Training    [place] Level (Grooming)  hair care, combing/brushing;wash face, hands;set up  -SD       User Key  (r) = Recorded By, (t) = Taken By, (c) = Cosigned By    Initials Name Provider Type    Lisa Llanes OT Occupational Therapist        Obj/Interventions     Row Name 03/09/21 1050          Shoulder (Therapeutic Exercise)    Shoulder (Therapeutic Exercise)  strengthening exercise  -SD     Shoulder Strengthening (Therapeutic Exercise)  bilateral;flexion;extension;aBduction;aDduction;horizontal aBduction/aDduction;yellow;10 repetitions  -[facility] Name 03/09/21 1050          Elbow/Forearm (Therapeutic Exercise)    Elbow/Forearm (Therapeutic Exercise)  strengthening exercise  -SD     Elbow/Forearm Strengthening (Therapeutic Exercise)  bilateral;flexion;extension;yellow;10 repetitions  -[facility] Name 03/09/21 1050          Therapeutic Exercise    Therapeutic Exercise  shoulder;elbow/forearm  -SD       User Key  (r) = Recorded By, (t) = Taken By, (c) = Cosigned By    Initials Name Provider Type    Lisa Llanes OT Occupational  Therapist        Goals/Plan    No documentation.       Clinical Impression     Row Name 03/09/21 1053          Pain Scale: Numbers Pre/Post-Treatment    Pretreatment Pain Rating  0/10 - no pain  -SD     Posttreatment Pain Rating  0/10 - no pain  -SD     Row Name 03/09/21 1053          Plan of Care Review    Plan of Care Reviewed With  patient  -SD     Progress  improving  -SD     Outcome Summary  OT tx completed. Patient completed bed mobility with SBA, transfer to chair with CGA and HHA. Completed UBB with SBA, LBD with Min A, grooming tasks with S/U followed by UB resistance exercises. Continue OT POC  -SD     Row Name 03/09/21 1053          Positioning and Restraints    Pre-Treatment Position  in bed  -SD     Post Treatment Position  chair  -SD     In Chair  sitting;call light within reach;encouraged to call for assist;exit alarm on  -SD       User Key  (r) = Recorded By, (t) = Taken By, (c) = Cosigned By    Initials Name Provider Type    Lisa Llanes OT Occupational Therapist        Outcome Measures     Row Name 03/09/21 1056          How much help from another is currently needed...    Putting on and taking off regular lower body clothing?  3  -SD     Bathing (including washing, rinsing, and drying)  3  -SD     Toileting (which includes using toilet bed pan or urinal)  3  -SD     Putting on and taking off regular upper body clothing  4  -SD     Taking care of personal grooming (such as brushing teeth)  4  -SD     Eating meals  4  -SD     AM-PAC 6 Clicks Score (OT)  21  -SD     Row Name 03/09/21 1056          Functional Assessment    Outcome Measure Options  AM-PAC 6 Clicks Daily Activity (OT)  -SD       User Key  (r) = Recorded By, (t) = Taken By, (c) = Cosigned By    Initials Name Provider Type    Lisa Llanes OT Occupational Therapist        Occupational Therapy Education                 Title: PT OT SLP Therapies (In Progress)     Topic: Occupational Therapy (In Progress)     Point: ADL  training (Done)     Description:   Instruct learner(s) on proper safety adaptation and remediation techniques during self care or transfers.   Instruct in proper use of assistive devices.              Learning Progress Summary           Patient Acceptance, E,TB, VU by SD at 3/9/2021 1057    Comment: Safety and sequencing during functional transfers.    Acceptance, E,TB, VU by SD at 3/8/2021 1459    Comment: Benefit of OT; OT POC                   Point: Home exercise program (Not Started)     Description:   Instruct learner(s) on appropriate technique for monitoring, assisting and/or progressing therapeutic exercises/activities.              Learner Progress:  Not documented in this visit.          Point: Precautions (Not Started)     Description:   Instruct learner(s) on prescribed precautions during self-care and functional transfers.              Learner Progress:  Not documented in this visit.          Point: Body mechanics (Not Started)     Description:   Instruct learner(s) on proper positioning and spine alignment during self-care, functional mobility activities and/or exercises.              Learner Progress:  Not documented in this visit.                      User Key     Initials Effective Dates Name Provider Type Discipline    SD 03/07/18 -  Lisa Salas OT Occupational Therapist OT              OT Recommendation and Plan  Planned Therapy Interventions (OT): activity tolerance training, adaptive equipment training, BADL retraining, patient/caregiver education/training, strengthening exercise, transfer/mobility retraining  Therapy Frequency (OT): 3 times/wk (5 times if indicated)  Plan of Care Review  Plan of Care Reviewed With: patient  Progress: improving  Outcome Summary: OT tx completed. Patient completed bed mobility with SBA, transfer to chair with CGA and HHA. Completed UBB with SBA, LBD with Min A, grooming tasks with S/U followed by UB resistance exercises. Continue OT POC     Time  Calculation:   Time Calculation- OT     Row Name 03/09/21 1059             Time Calculation- OT    OT Start Time  0915  -SD      OT Stop Time  0939  -SD      OT Time Calculation (min)  24 min  -SD      OT Received On  03/09/21  -SD      OT Goal Re-Cert Due Date  03/18/21  -SD         Timed Charges    86797 - OT Therapeutic Exercise Minutes  15  -SD      74279 - OT Self Care/Mgmt Minutes  9  -SD        User Key  (r) = Recorded By, (t) = Taken By, (c) = Cosigned By    Initials Name Provider Type    Lisa Llanes OT Occupational Therapist        Therapy Charges for Today     Code Description Service Date Service Provider Modifiers Qty    34064726951  OT EVAL MOD COMPLEXITY 3 3/8/2021 Lisa Salas OT GO 1    [id number]  OT THER PROC EA 15 MIN 3/9/2021 Lisa Salas OT GO 1    [id number]  OT SELF CARE/MGMT/TRAIN EA 15 MIN 3/9/2021 Lisa Salas OT GO 1               Lisa Salas OT  3/9/2021

## 2021-03-10 LAB
ALBUMIN SERPL-MCNC: 3.5 G/DL (ref 3.5–5.2)
ALBUMIN/GLOB SERPL: 1.3 G/DL
ALP SERPL-CCNC: 92 U/L (ref 39–117)
ALT SERPL W P-5'-P-CCNC: 17 U/L (ref 1–33)
ANION GAP SERPL CALCULATED.3IONS-SCNC: 8.8 MMOL/L (ref 5–15)
AST SERPL-CCNC: 19 U/L (ref 1–32)
BASOPHILS # BLD AUTO: 0.08 10*3/MM3 (ref 0–0.2)
BASOPHILS NFR BLD AUTO: 1 % (ref 0–1.5)
BILIRUB SERPL-MCNC: 0.3 MG/DL (ref 0–1.2)
BUN SERPL-MCNC: 17 MG/DL (ref 8–23)
BUN/CREAT SERPL: 17 (ref 7–25)
CALCIUM SPEC-SCNC: 8.8 MG/DL (ref 8.6–10.5)
CHLORIDE SERPL-SCNC: 106 MMOL/L (ref 98–107)
CO2 SERPL-SCNC: 27.2 MMOL/L (ref 22–29)
CREAT SERPL-MCNC: 1 MG/DL (ref 0.57–1)
DEPRECATED RDW RBC AUTO: 39.8 FL (ref 37–54)
EOSINOPHIL # BLD AUTO: 0.3 10*3/MM3 (ref 0–0.4)
EOSINOPHIL NFR BLD AUTO: 3.7 % (ref 0.3–6.2)
ERYTHROCYTE [DISTWIDTH] IN BLOOD BY AUTOMATED COUNT: 12.5 % (ref 12.3–15.4)
GFR SERPL CREATININE-BSD FRML MDRD: 56 ML/MIN/1.73
GLOBULIN UR ELPH-MCNC: 2.6 GM/DL
GLUCOSE SERPL-MCNC: 80 MG/DL (ref 65–99)
HCT VFR BLD AUTO: 33.2 % (ref 34–46.6)
HGB BLD-MCNC: 10.8 G/DL (ref 12–15.9)
IMM GRANULOCYTES # BLD AUTO: 0.29 10*3/MM3 (ref 0–0.05)
IMM GRANULOCYTES NFR BLD AUTO: 3.6 % (ref 0–0.5)
LYMPHOCYTES # BLD AUTO: 2.38 10*3/MM3 (ref 0.7–3.1)
LYMPHOCYTES NFR BLD AUTO: 29.7 % (ref 19.6–45.3)
MCH RBC QN AUTO: 29 PG (ref 26.6–33)
MCHC RBC AUTO-ENTMCNC: 32.5 G/DL (ref 31.5–35.7)
MCV RBC AUTO: 89.2 FL (ref 79–97)
MONOCYTES # BLD AUTO: 0.6 10*3/MM3 (ref 0.1–0.9)
MONOCYTES NFR BLD AUTO: 7.5 % (ref 5–12)
NEUTROPHILS NFR BLD AUTO: 4.36 10*3/MM3 (ref 1.7–7)
NEUTROPHILS NFR BLD AUTO: 54.5 % (ref 42.7–76)
NRBC BLD AUTO-RTO: 0 /100 WBC (ref 0–0.2)
PLATELET # BLD AUTO: 181 10*3/MM3 (ref 140–450)
PMV BLD AUTO: 9.5 FL (ref 6–12)
POTASSIUM SERPL-SCNC: 3.9 MMOL/L (ref 3.5–5.2)
PROT SERPL-MCNC: 6.1 G/DL (ref 6–8.5)
RBC # BLD AUTO: 3.72 10*6/MM3 (ref 3.77–5.28)
SODIUM SERPL-SCNC: 142 MMOL/L (ref 136–145)
WBC # BLD AUTO: 8.01 10*3/MM3 (ref 3.4–10.8)

## 2021-03-10 PROCEDURE — 25010000002 HEPARIN (PORCINE) PER 1000 UNITS: Performed by: FAMILY MEDICINE

## 2021-03-10 PROCEDURE — 97110 THERAPEUTIC EXERCISES: CPT

## 2021-03-10 PROCEDURE — 99232 SBSQ HOSP IP/OBS MODERATE 35: CPT | Performed by: INTERNAL MEDICINE

## 2021-03-10 PROCEDURE — 97535 SELF CARE MNGMENT TRAINING: CPT

## 2021-03-10 PROCEDURE — 97116 GAIT TRAINING THERAPY: CPT

## 2021-03-10 PROCEDURE — 80053 COMPREHEN METABOLIC PANEL: CPT | Performed by: INTERNAL MEDICINE

## 2021-03-10 PROCEDURE — 85025 COMPLETE CBC W/AUTO DIFF WBC: CPT | Performed by: INTERNAL MEDICINE

## 2021-03-10 RX ORDER — NORTRIPTYLINE HYDROCHLORIDE 10 MG/1
10 CAPSULE ORAL NIGHTLY
Status: DISCONTINUED | OUTPATIENT
Start: 2021-03-10 | End: 2021-03-11 | Stop reason: HOSPADM

## 2021-03-10 RX ORDER — BUPROPION HYDROCHLORIDE 100 MG/1
200 TABLET, EXTENDED RELEASE ORAL DAILY
Status: DISCONTINUED | OUTPATIENT
Start: 2021-03-10 | End: 2021-03-11 | Stop reason: HOSPADM

## 2021-03-10 RX ORDER — QUETIAPINE FUMARATE 100 MG/1
300 TABLET, FILM COATED ORAL NIGHTLY
Status: DISCONTINUED | OUTPATIENT
Start: 2021-03-10 | End: 2021-03-11 | Stop reason: HOSPADM

## 2021-03-10 RX ORDER — FLUTICASONE PROPIONATE 50 MCG
2 SPRAY, SUSPENSION (ML) NASAL DAILY
Status: DISCONTINUED | OUTPATIENT
Start: 2021-03-10 | End: 2021-03-11 | Stop reason: HOSPADM

## 2021-03-10 RX ORDER — TIZANIDINE 4 MG/1
4 TABLET ORAL EVERY 8 HOURS PRN
Status: DISCONTINUED | OUTPATIENT
Start: 2021-03-10 | End: 2021-03-11 | Stop reason: HOSPADM

## 2021-03-10 RX ADMIN — SODIUM CHLORIDE, POTASSIUM CHLORIDE, SODIUM LACTATE AND CALCIUM CHLORIDE 100 ML/HR: 600; 310; 30; 20 INJECTION, SOLUTION INTRAVENOUS at 01:27

## 2021-03-10 RX ADMIN — HEPARIN SODIUM 5000 UNITS: 5000 INJECTION INTRAVENOUS; SUBCUTANEOUS at 08:15

## 2021-03-10 RX ADMIN — TIZANIDINE 4 MG: 4 TABLET ORAL at 23:01

## 2021-03-10 RX ADMIN — NORTRIPTYLINE HYDROCHLORIDE 10 MG: 10 CAPSULE ORAL at 20:10

## 2021-03-10 RX ADMIN — QUETIAPINE FUMARATE 300 MG: 100 TABLET ORAL at 20:10

## 2021-03-10 RX ADMIN — LORAZEPAM 0.5 MG: 0.5 TABLET ORAL at 23:01

## 2021-03-10 RX ADMIN — BUPRENORPHINE AND NALOXONE 1 FILM: 8; 2 FILM BUCCAL; SUBLINGUAL at 08:16

## 2021-03-10 RX ADMIN — SODIUM CHLORIDE, PRESERVATIVE FREE 10 ML: 5 INJECTION INTRAVENOUS at 08:18

## 2021-03-10 RX ADMIN — LORAZEPAM 0.5 MG: 0.5 TABLET ORAL at 00:15

## 2021-03-10 RX ADMIN — BUPROPION HYDROCHLORIDE 200 MG: 100 TABLET, FILM COATED, EXTENDED RELEASE ORAL at 17:22

## 2021-03-10 RX ADMIN — HEPARIN SODIUM 5000 UNITS: 5000 INJECTION INTRAVENOUS; SUBCUTANEOUS at 20:10

## 2021-03-10 NOTE — PLAN OF CARE
Goal Outcome Evaluation:  Plan of Care Reviewed With: patient  Progress: improving  Outcome Summary: OT tx completed. Patient completed bed mobility with cond ind, transfer with SBA, walked 15' using RW with CGA; able to bobby/doff socks with SBA and completed BUE ther ex using yellow resistance band. Continue OT POC

## 2021-03-10 NOTE — PLAN OF CARE
"Goal Outcome Evaluation:  Plan of Care Reviewed With: patient  Progress: improving  Outcome Summary: Pt rested well during shift, she received one dose of attivan because she stated that \"she could not sleep.\" VSS.  "

## 2021-03-10 NOTE — PLAN OF CARE
Goal Outcome Evaluation:         Behavioral Consult completed. Reports to nursing that she is depressed but denies depression to Behavioral Consult. Patient up to bedside commode for toileting needs. Able to feed self but refused dinner stating her friend was going to bring her something better to eat later. Continue to monitor labs and patient progress.
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
## 2021-03-10 NOTE — PROGRESS NOTES
Continued Stay Note   [place]     Patient Name: Cait Lu  MRN: [id number]  Today's Date: 3/10/2021    Admit Date: 3/7/2021    Discharge Plan     Row Name 03/10/21 1437       Plan    Plan Comments SW followed up with pt regarding housing concerns. Pt stated that she has been calling family and friends to see if she could stay with them after she discharges. Pt reports that she no longer wants to live at her previous address due to illegal substances being present. Pt reports that she has signed up for [employer], but is on the waiting list. Pt reports that she might live with a friend named Negar, her father, or her sister. She stated that her sister was supposed to call her back. Pt declined information on homeless shelters, but SW will provide her with an application for the [employer]!. Pt identified no other needs at this time. CM will continue to follow and assist as needed.            Expected Discharge Date and Time     Expected Discharge Date Expected Discharge Time    Mar 10, 2021             KARISHMA Hall

## 2021-03-10 NOTE — PLAN OF CARE
Goal Outcome Evaluation:  Plan of Care Reviewed With: patient  Progress: improving  Outcome Summary: Pt with increased distance amb 120 feet and decreased A for transfers and amb. Pt performed B LE ex in sitting and reclined. Con't with PT POC and progress as tolerated

## 2021-03-10 NOTE — THERAPY TREATMENT NOTE
Patient Name: Cait Lu  : 1960    MRN: [id number]                              Today's Date: 3/10/2021       Admit Date: 3/7/2021    Visit Dx:     ICD-10-CM ICD-9-CM   1. Renal insufficiency  N28.9 593.9   2. Delirium  R41.0 780.09   3. Uncomplicated opioid dependence (CMS/[facility])  F11.20 304.00   4. Methamphetamine abuse (CMS/[facility])  F15.10 305.70     Patient Active Problem List   Diagnosis   • Benign essential hypertension   • Chronic pain syndrome   • Multiple-type hyperlipidemia   • Nausea and vomiting   • Acute exacerbation of chronic obstructive pulmonary disease (COPD) (CMS/[facility])   • Esophagitis, reflux   • Peripheral vascular disease (CMS/[facility])   • Vitamin B12 deficiency   • Vitamin D deficiency   • Renal insufficiency   • Acute kidney injury (CMS/[facility])   • Polysubstance abuse (CMS/[facility])   • Methamphetamine abuse (CMS/[facility])     Past Medical History:   Diagnosis Date   • Abdominal pain    • Acute bronchitis with bronchospasm    • Allergic rhinitis due to pollen    • Cellulitis    • Cutaneous candidiasis    • Edema    • Former smoker    • Hypercholesterolemia    • Hypertension    • Impaired functional mobility, balance, gait, and endurance    • Joint pain of ankle and foot    • Legally blind    • Low back pain    • Neuralgia    • Sebaceous cyst    • Toe joint pain    • Urinary tract infection, acute    • Vitamin D deficiency      History reviewed. No pertinent surgical history.  General Information     Row Name 03/10/21 1522          OT Time and Intention    Document Type  therapy note (daily note)  -SD     Mode of Treatment  occupational therapy  -SD       User Key  (r) = Recorded By, (t) = Taken By, (c) = Cosigned By    Initials Name Provider Type    SD Lisa Salas OT Occupational Therapist          Mobility/ADL's     Row Name 03/10/21 1522          Bed Mobility    Bed Mobility  supine-sit  -SD     Supine-Sit [place] (Bed Mobility)  modified independence  -SD     Assistive Device (Bed  Mobility)  bed rails;head of bed elevated  -SD     [facility] Name 03/10/21 1522          Transfers    Transfers  sit-stand transfer  -SD     Sit-Stand [place] (Transfers)  standby assist  -SD     [facility] Name 03/10/21 1522          Sit-Stand Transfer    Assistive Device (Sit-Stand Transfers)  walker, front-wheeled  -SD     [facility] Name 03/10/21 1522          Functional Mobility    Functional Mobility- Ind. Level  contact guard assist  -SD     Functional Mobility- Device  rolling walker  -SD     Functional Mobility-Distance (Feet)  15  -SD     [facility] Name 03/10/21 1522          Lower Body Dressing Assessment/Training    [place] Level (Lower Body Dressing)  don;doff;socks;standby assist  -SD       User Key  (r) = Recorded By, (t) = Taken By, (c) = Cosigned By    Initials Name Provider Type    Lisa Llnaes OT Occupational Therapist        Obj/Interventions     Row Name 03/10/21 1523          Shoulder (Therapeutic Exercise)    Shoulder Strengthening (Therapeutic Exercise)  bilateral;flexion;extension;aBduction;aDduction;horizontal aBduction/aDduction;yellow;10 repetitions  -SD     Row Name 03/10/21 1523          Elbow/Forearm (Therapeutic Exercise)    Elbow/Forearm Strengthening (Therapeutic Exercise)  bilateral;flexion;extension;resistance band;yellow;10 repetitions  -SD     Row Name 03/10/21 1523          Therapeutic Exercise    Therapeutic Exercise  shoulder;elbow/forearm  -SD       User Key  (r) = Recorded By, (t) = Taken By, (c) = Cosigned By    Initials Name Provider Type    Lisa Llanes OT Occupational Therapist        Goals/Plan    No documentation.       Clinical Impression     Row Name 03/10/21 1523          Pain Scale: Numbers Pre/Post-Treatment    Pretreatment Pain Rating  8/10  -SD     Posttreatment Pain Rating  8/10  -SD     Pain Location - Side  Right  -SD     Pain Location  hip  -SD     Pain Intervention(s)  Repositioned;Ambulation/increased activity  -SD     Row Name 03/10/21 1523          Plan  of Care Review    Plan of Care Reviewed With  patient  -SD     Progress  improving  -SD     Outcome Summary  OT tx completed. Patient completed bed mobility with cond ind, transfer with SBA, walked 15' using RW with CGA; able to bobby/doff socks with SBA and completed BUE ther ex using yellow resistance band. Continue OT POC  -SD     Row Name 03/10/21 1523          Positioning and Restraints    Pre-Treatment Position  in bed  -SD     Post Treatment Position  chair  -SD     In Chair  sitting;call light within reach;encouraged to call for assist;exit alarm on;with PT  -SD       User Key  (r) = Recorded By, (t) = Taken By, (c) = Cosigned By    Initials Name Provider Type    Lias Llanes OT Occupational Therapist        Outcome Measures     Row Name 03/10/21 1525          How much help from another is currently needed...    Putting on and taking off regular lower body clothing?  3  -SD     Bathing (including washing, rinsing, and drying)  3  -SD     Toileting (which includes using toilet bed pan or urinal)  3  -SD     Putting on and taking off regular upper body clothing  4  -SD     Taking care of personal grooming (such as brushing teeth)  4  -SD     Eating meals  4  -SD     AM-PAC 6 Clicks Score (OT)  21  -SD     Row Name 03/10/21 1525          Functional Assessment    Outcome Measure Options  AM-PAC 6 Clicks Daily Activity (OT)  -SD       User Key  (r) = Recorded By, (t) = Taken By, (c) = Cosigned By    Initials Name Provider Type    Lisa Llanes OT Occupational Therapist        Occupational Therapy Education                 Title: PT OT SLP Therapies (In Progress)     Topic: Occupational Therapy (In Progress)     Point: ADL training (Done)     Description:   Instruct learner(s) on proper safety adaptation and remediation techniques during self care or transfers.   Instruct in proper use of assistive devices.              Learning Progress Summary           Patient Acceptance, E,TB, VU by SD at  3/10/2021 1525    Comment: AE for LBD to conserve energy d/t SOA experienced bending over.    Acceptance, E,TB, VU by SD at 3/9/2021 1057    Comment: Safety and sequencing during functional transfers.    Acceptance, E,TB, VU by SD at 3/8/2021 1459    Comment: Benefit of OT; OT POC                   Point: Home exercise program (Not Started)     Description:   Instruct learner(s) on appropriate technique for monitoring, assisting and/or progressing therapeutic exercises/activities.              Learner Progress:  Not documented in this visit.          Point: Precautions (Not Started)     Description:   Instruct learner(s) on prescribed precautions during self-care and functional transfers.              Learner Progress:  Not documented in this visit.          Point: Body mechanics (Not Started)     Description:   Instruct learner(s) on proper positioning and spine alignment during self-care, functional mobility activities and/or exercises.              Learner Progress:  Not documented in this visit.                      User Key     Initials Effective Dates Name Provider Type Discipline    SD 03/07/18 -  Lisa Salas OT Occupational Therapist OT              OT Recommendation and Plan  Planned Therapy Interventions (OT): activity tolerance training, adaptive equipment training, BADL retraining, patient/caregiver education/training, strengthening exercise, transfer/mobility retraining  Therapy Frequency (OT): 3 times/wk (5 times if indicated)  Plan of Care Review  Plan of Care Reviewed With: patient  Progress: improving  Outcome Summary: OT tx completed. Patient completed bed mobility with cond ind, transfer with SBA, walked 15' using RW with CGA; able to bobby/doff socks with SBA and completed BUE ther ex using yellow resistance band. Continue OT POC     Time Calculation:   Time Calculation- OT     Row Name 03/10/21 1526             Time Calculation- OT    OT Start Time  1459  -SD      OT Stop Time  1522  -SD       OT Time Calculation (min)  23 min  -SD      OT Received On  03/10/21  -SD      OT Goal Re-Cert Due Date  03/18/21  -SD         Timed Charges    62007 - OT Therapeutic Exercise Minutes  10  -SD      67813 - OT Therapeutic Activity Minutes  5  -SD      99206 - OT Self Care/Mgmt Minutes  8  -SD        User Key  (r) = Recorded By, (t) = Taken By, (c) = Cosigned By    Initials Name Provider Type    SD Lisa Salas OT Occupational Therapist        Therapy Charges for Today     Code Description Service Date Service Provider Modifiers Qty    94715020235 HC OT THER PROC EA 15 MIN 3/9/2021 Lisa Salas OT GO 1    18906773490 HC OT SELF CARE/MGMT/TRAIN EA 15 MIN 3/9/2021 Lisa Salas OT GO 1    [id number]  OT THER PROC EA 15 MIN 3/10/2021 Lisa Salas OT GO 1    [id number]  OT SELF CARE/MGMT/TRAIN EA 15 MIN 3/10/2021 Lisa Salas OT GO 1               Lisa Salas OT  3/10/2021

## 2021-03-10 NOTE — THERAPY TREATMENT NOTE
Patient Name: Cait Lu  : 1960    MRN: [id number]                              Today's Date: 3/10/2021       Admit Date: 3/7/2021    Visit Dx:     ICD-10-CM ICD-9-CM   1. Renal insufficiency  N28.9 593.9   2. Delirium  R41.0 780.09   3. Uncomplicated opioid dependence (CMS/[facility])  F11.20 304.00   4. Methamphetamine abuse (CMS/[facility])  F15.10 305.70     Patient Active Problem List   Diagnosis   • Benign essential hypertension   • Chronic pain syndrome   • Multiple-type hyperlipidemia   • Nausea and vomiting   • Acute exacerbation of chronic obstructive pulmonary disease (COPD) (CMS/[facility])   • Esophagitis, reflux   • Peripheral vascular disease (CMS/[facility])   • Vitamin B12 deficiency   • Vitamin D deficiency   • Renal insufficiency   • Acute kidney injury (CMS/[facility])   • Polysubstance abuse (CMS/[facility])   • Methamphetamine abuse (CMS/[facility])     Past Medical History:   Diagnosis Date   • Abdominal pain    • Acute bronchitis with bronchospasm    • Allergic rhinitis due to pollen    • Cellulitis    • Cutaneous candidiasis    • Edema    • Former smoker    • Hypercholesterolemia    • Hypertension    • Impaired functional mobility, balance, gait, and endurance    • Joint pain of ankle and foot    • Legally blind    • Low back pain    • Neuralgia    • Sebaceous cyst    • Toe joint pain    • Urinary tract infection, acute    • Vitamin D deficiency      History reviewed. No pertinent surgical history.  General Information     Row Name 03/10/21 1520          Physical Therapy Time and Intention    Document Type  therapy note (daily note)  -CC     Mode of Treatment  physical therapy  -CC     Row Name 03/10/21 1520          General Information    Patient Profile Reviewed  yes  -CC     Existing Precautions/Restrictions  fall  -CC     Row Name 03/10/21 1528          Safety Issues, Functional Mobility    Safety Issues Affecting Function (Mobility)  insight into deficits/self-awareness;safety precaution awareness;safety  precautions follow-through/compliance  -     Impairments Affecting Function (Mobility)  balance;endurance/activity tolerance;strength;pain  -       User Key  (r) = Recorded By, (t) = Taken By, (c) = Cosigned By    Initials Name Provider Type    Meghna Brush PTA Physical Therapy Assistant        Mobility     Row Name 03/10/21 1520          Sit-Stand Transfer    Sit-Stand [place] (Transfers)  verbal cues;standby assist  -     Assistive Device (Sit-Stand Transfers)  walker, front-wheeled  -CC     Row Name 03/10/21 1520          Gait/Stairs (Locomotion)    [place] Level (Gait)  standby assist;verbal cues  -     Assistive Device (Gait)  walker, front-wheeled  -     Distance in Feet (Gait)  120  -CC     Deviations/Abnormal Patterns (Gait)  base of support, wide;stride length decreased;gait speed decreased;antalgic  -CC     Right Sided Gait Deviations  forward flexed posture;weight shift ability decreased;heel strike decreased  -       User Key  (r) = Recorded By, (t) = Taken By, (c) = Cosigned By    Initials Name Provider Type    Meghna Brush PTA Physical Therapy Assistant        Obj/Interventions     [facility] Name 03/10/21 1520          Strength Comprehensive (MMT)    Comment, General Manual Muscle Testing (MMT) Assessment  Pt performed B LE ex in sitting and reclined AP, QS, glut sets, hip abd, LAQ, marching 20 reps each  -       User Key  (r) = Recorded By, (t) = Taken By, (c) = Cosigned By    Initials Name Provider Type    Meghna Brush PTA Physical Therapy Assistant        Goals/Plan    No documentation.       Clinical Impression     Row Name 03/10/21 1520          Pain Scale: Numbers Pre/Post-Treatment    Pretreatment Pain Rating  8/10  -CC     Posttreatment Pain Rating  8/10  -     Pain Location - Side  Right  -     Pain Location  hip  -     Pre/Posttreatment Pain Comment  Pt reported pain same during amb and ex as at rest  -     Pain Intervention(s)   Repositioned;Ambulation/increased activity  -     Row Name 03/10/21 1520          Plan of Care Review    Plan of Care Reviewed With  patient  -CC     Progress  improving  -CC     Outcome Summary  Pt with increased distance amb 120 feet and decreased A for transfers and amb. Pt performed B LE ex in sitting and reclined. Con't with PT POC and progress as tolerated  -     Row Name 03/10/21 1520          Positioning and Restraints    Pre-Treatment Position  sitting in chair/recliner  -CC     Post Treatment Position  chair  -CC     In Chair  reclined;encouraged to call for assist;call light within reach  -       User Key  (r) = Recorded By, (t) = Taken By, (c) = Cosigned By    Initials Name Provider Type    Meghna Brush PTA Physical Therapy Assistant        Outcome Measures     Row Name 03/10/21 1520          How much help from another person do you currently need...    Turning from your back to your side while in flat bed without using bedrails?  3  -CC     Moving from lying on back to sitting on the side of a flat bed without bedrails?  3  -CC     Moving to and from a bed to a chair (including a wheelchair)?  3  -CC     Standing up from a chair using your arms (e.g., wheelchair, bedside chair)?  3  -CC     Climbing 3-5 steps with a railing?  2  -CC     To walk in hospital room?  3  -CC     AM-PAC 6 Clicks Score (PT)  17  -CC     Row Name 03/10/21 1520          Functional Assessment    Outcome Measure Options  AM-PAC 6 Clicks Basic Mobility (PT)  -CC       User Key  (r) = Recorded By, (t) = Taken By, (c) = Cosigned By    Initials Name Provider Type    Meghna Brush PTA Physical Therapy Assistant        Physical Therapy Education                 Title: PT OT SLP Therapies (In Progress)     Topic: Physical Therapy (In Progress)     Point: Mobility training (Done)     Learning Progress Summary           Patient Acceptance, E,TB, VU by  at 3/10/2021 1830    Comment: Importance of increasing mobility  daily throughout the day    Acceptance, E,D, VU,DU by TW at 3/9/2021 1451    Comment: Pt education on LE ther ex technique including counting aloud for ismetric contraction to prevent breath holding. Pt also begain gait training with rolling walker.    Acceptance, E,TB, VU by  at 3/8/2021 2010    Comment: Role of PT and POC                   Point: Home exercise program (Done)     Learning Progress Summary           Patient Acceptance, E,D, VU,DU by  at 3/9/2021 1451    Comment: Pt education on LE ther ex technique including counting aloud for ismetric contraction to prevent breath holding. Pt also begain gait training with rolling walker.                   Point: Body mechanics (Not Started)     Learner Progress:  Not documented in this visit.          Point: Precautions (Not Started)     Learner Progress:  Not documented in this visit.                      User Key     Initials Effective Dates Name Provider Type Discipline     04/03/18 -  Whitley Chatman, PT Physical Therapist PT     03/07/18 -  Meghna Flor PTA Physical Therapy Assistant PT     03/26/19 -  Maribel Donis, PT Physical Therapist PT              PT Recommendation and Plan     Plan of Care Reviewed With: patient  Progress: improving  Outcome Summary: Pt with increased distance amb 120 feet and decreased A for transfers and amb. Pt performed B LE ex in sitting and reclined. Con't with PT POC and progress as tolerated     Time Calculation:   PT Charges     Row Name 03/10/21 1520             Time Calculation    Start Time  1520  -CC      Stop Time  1550  -CC      Time Calculation (min)  30 min  -CC      PT Received On  03/10/21  -      PT Goal Re-Cert Due Date  03/18/21  -         Timed Charges    40807 - PT Therapeutic Exercise Minutes  15  -CC      99551 - Gait Training Minutes   15  -CC        User Key  (r) = Recorded By, (t) = Taken By, (c) = Cosigned By    Initials Name Provider Type    CC Meghna Flor, SAMI Physical Therapy  Assistant        Therapy Charges for Today     Code Description Service Date Service Provider Modifiers Qty    17515029690 HC PT THER PROC EA 15 MIN 3/10/2021 Meghna Flor, PTA GP 1    81604699983 HC GAIT TRAINING EA 15 MIN 3/10/2021 Meghna Flor, SAMI GP 1          PT G-Codes  Outcome Measure Options: AM-PAC 6 Clicks Daily Activity (OT)  AM-PAC 6 Clicks Score (PT): 17  AM-PAC 6 Clicks Score (OT): 21    Meghna Flor PTA  3/10/2021

## 2021-03-11 ENCOUNTER — READMISSION MANAGEMENT (OUTPATIENT)
Dept: CALL CENTER | Facility: HOSPITAL | Age: 61
End: 2021-03-11

## 2021-03-11 VITALS
DIASTOLIC BLOOD PRESSURE: 68 MMHG | SYSTOLIC BLOOD PRESSURE: 148 MMHG | OXYGEN SATURATION: 96 % | TEMPERATURE: 98.6 F | HEIGHT: 64 IN | HEART RATE: 71 BPM | BODY MASS INDEX: 39.75 KG/M2 | RESPIRATION RATE: 20 BRPM | WEIGHT: 232.81 LBS

## 2021-03-11 PROCEDURE — 97530 THERAPEUTIC ACTIVITIES: CPT

## 2021-03-11 PROCEDURE — 25010000002 HEPARIN (PORCINE) PER 1000 UNITS: Performed by: FAMILY MEDICINE

## 2021-03-11 PROCEDURE — 25010000002 INFLUENZA VAC SPLIT QUAD 0.5 ML SUSPENSION PREFILLED SYRINGE: Performed by: FAMILY MEDICINE

## 2021-03-11 PROCEDURE — 90686 IIV4 VACC NO PRSV 0.5 ML IM: CPT | Performed by: FAMILY MEDICINE

## 2021-03-11 PROCEDURE — G0008 ADMIN INFLUENZA VIRUS VAC: HCPCS | Performed by: FAMILY MEDICINE

## 2021-03-11 PROCEDURE — 99239 HOSP IP/OBS DSCHRG MGMT >30: CPT | Performed by: INTERNAL MEDICINE

## 2021-03-11 RX ORDER — OXYCODONE HYDROCHLORIDE 30 MG/1
30 TABLET, FILM COATED, EXTENDED RELEASE ORAL EVERY 12 HOURS SCHEDULED
Qty: 10 TABLET | Refills: 0 | Status: SHIPPED | OUTPATIENT
Start: 2021-03-11 | End: 2021-03-11 | Stop reason: ALTCHOICE

## 2021-03-11 RX ORDER — OXYCODONE AND ACETAMINOPHEN 10; 325 MG/1; MG/1
1 TABLET ORAL EVERY 6 HOURS PRN
Qty: 18 TABLET | Refills: 0 | Status: ON HOLD | OUTPATIENT
Start: 2021-03-11 | End: 2022-09-02

## 2021-03-11 RX ADMIN — INFLUENZA VIRUS VACCINE 0.5 ML: 15; 15; 15; 15 SUSPENSION INTRAMUSCULAR at 16:54

## 2021-03-11 RX ADMIN — BUPROPION HYDROCHLORIDE 200 MG: 100 TABLET, FILM COATED, EXTENDED RELEASE ORAL at 09:27

## 2021-03-11 RX ADMIN — SODIUM CHLORIDE, PRESERVATIVE FREE 10 ML: 5 INJECTION INTRAVENOUS at 09:30

## 2021-03-11 RX ADMIN — FLUTICASONE PROPIONATE 2 SPRAY: 50 SPRAY, METERED NASAL at 09:30

## 2021-03-11 RX ADMIN — HEPARIN SODIUM 5000 UNITS: 5000 INJECTION INTRAVENOUS; SUBCUTANEOUS at 09:27

## 2021-03-11 RX ADMIN — BUPRENORPHINE AND NALOXONE 1 FILM: 8; 2 FILM BUCCAL; SUBLINGUAL at 09:27

## 2021-03-11 NOTE — THERAPY TREATMENT NOTE
Patient Name: Cait Lu  : 1960    MRN: [id number]                              Today's Date: 3/11/2021       Admit Date: 3/7/2021    Visit Dx:     ICD-10-CM ICD-9-CM   1. Renal insufficiency  N28.9 593.9   2. Delirium  R41.0 780.09   3. Uncomplicated opioid dependence (CMS/[facility])  F11.20 304.00   4. Methamphetamine abuse (CMS/[facility])  F15.10 305.70     Patient Active Problem List   Diagnosis   • Benign essential hypertension   • Chronic pain syndrome   • Multiple-type hyperlipidemia   • Nausea and vomiting   • Acute exacerbation of chronic obstructive pulmonary disease (COPD) (CMS/[facility])   • Esophagitis, reflux   • Peripheral vascular disease (CMS/[facility])   • Vitamin B12 deficiency   • Vitamin D deficiency   • Renal insufficiency   • Acute kidney injury (CMS/[facility])   • Polysubstance abuse (CMS/HCC)   • Methamphetamine abuse (CMS/H[facility])     Past Medical History:   Diagnosis Date   • Abdominal pain    • Acute bronchitis with bronchospasm    • Allergic rhinitis due to pollen    • Cellulitis    • Cutaneous candidiasis    • Edema    • Former smoker    • Hypercholesterolemia    • Hypertension    • Impaired functional mobility, balance, gait, and endurance    • Joint pain of ankle and foot    • Legally blind    • Low back pain    • Neuralgia    • Sebaceous cyst    • Toe joint pain    • Urinary tract infection, acute    • Vitamin D deficiency      History reviewed. No pertinent surgical history.  General Information     Row Name 21 1236          OT Time and Intention    Document Type  therapy note (daily note)  -     Mode of Treatment  occupational therapy  -       User Key  (r) = Recorded By, (t) = Taken By, (c) = Cosigned By    Initials Name Provider Type     Emani Stratton Occupational Therapist          Mobility/ADL's     Row Name 21 1236          Transfers    Transfers  toilet transfer  -     Sit-Stand [place] (Transfers)  modified independence  -     [place] Level (Toilet  Transfer)  modified independence  -     Assistive Device (Toilet Transfer)  walker, front-wheeled;grab bars/safety frame  -     Row Name 03/11/21 1236          Sit-Stand Transfer    Assistive Device (Sit-Stand Transfers)  walker, front-wheeled  -[facility] Name 03/11/21 1236          Toilet Transfer    Type (Toilet Transfer)  sit-stand;stand-sit  -AH     Row Name 03/11/21 1236          Functional Mobility    Functional Mobility- Ind. Level  conditional independence  -     Functional Mobility- Device  rolling walker  -     Functional Mobility-Distance (Feet)  24  -     Functional Mobility- Comment  pt walked 24' to bathroom and 24' back to her chair  -[facility] Name 03/11/21 1236          Upper Body Dressing Assessment/Training    [place] Level (Upper Body Dressing)  don;pull-over garment;set up  -[facility] Name 03/11/21 1236          Lower Body Dressing Assessment/Training    [place] Level (Lower Body Dressing)  don;pants/bottoms;set up  -AH     Row Name 03/11/21 123          Toileting Assessment/Training    [place] Level (Toileting)  adjust/manage clothing;perform perineal hygiene;independent  -       User Key  (r) = Recorded By, (t) = Taken By, (c) = Cosigned By    Initials Name Provider Type    Emani Chandler Occupational Therapist        Obj/Interventions    No documentation.       Goals/Plan     Row Name 03/11/21 1241          Transfer Goal 1 (OT)    Progress/Outcome (Transfer Goal 1, OT)  goal met  -AH     Row Name 03/11/21 1241          Dressing Goal 1 (OT)    Progress/Outcome (Dressing Goal 1, OT)  goal met  -AH     Row Name 03/11/21 1241          Toileting Goal 1 (OT)    Progress/Outcome (Toileting Goal 1, OT)  goal met  -AH     Row Name 03/11/21 1241          Strength Goal 1 (OT)    Progress/Outcome (Strength Goal 1, OT)  goal met  -       User Key  (r) = Recorded By, (t) = Taken By, (c) = Cosigned By    Initials Name Provider Type    Emani Chandler Occupational  Therapist        Clinical Impression     Row Name 03/11/21 1239          Pain Scale: Numbers Pre/Post-Treatment    Pretreatment Pain Rating  0/10 - no pain  -     Posttreatment Pain Rating  0/10 - no pain  -     Row Name 03/11/21 1239          Plan of Care Review    Plan of Care Reviewed With  patient  -     Progress  improving  -     Outcome Summary  Pt received sitting up in her chair, pt modified independent to stand using RW, walked to bathroom independently using RW.  Pt able to perform toileting tasks independently.  Pt set up to don her shirt and pants.  Pt was left sitting up in her chair with call light within reach.  -     Row Name 03/11/21 1239          Positioning and Restraints    Pre-Treatment Position  sitting in chair/recliner  -     Post Treatment Position  chair  -     In Chair  sitting;call light within reach;encouraged to call for assist;exit alarm on  -       User Key  (r) = Recorded By, (t) = Taken By, (c) = Cosigned By    Initials Name Provider Type    Emani Chandler Occupational Therapist        Outcome Measures     Row Name 03/11/21 1242          How much help from another is currently needed...    Putting on and taking off regular lower body clothing?  3  -AH     Bathing (including washing, rinsing, and drying)  3  -AH     Toileting (which includes using toilet bed pan or urinal)  4  -AH     Putting on and taking off regular upper body clothing  4  -AH     Taking care of personal grooming (such as brushing teeth)  4  -AH     Eating meals  4  -AH     AM-PAC 6 Clicks Score (OT)  22  -       User Key  (r) = Recorded By, (t) = Taken By, (c) = Cosigned By    Initials Name Provider Type    Emani Chandler Occupational Therapist        Occupational Therapy Education                 Title: PT OT SLP Therapies (In Progress)     Topic: Occupational Therapy (In Progress)     Point: ADL training (Done)     Description:   Instruct learner(s) on proper safety adaptation and  remediation techniques during self care or transfers.   Instruct in proper use of assistive devices.              Learning Progress Summary           Patient Acceptance, E,TB, VU by  at 3/11/2021 1242    Comment: benefit of activity to improve functional independence.    Acceptance, E,TB, VU by SD at 3/10/2021 1525    Comment: AE for LBD to conserve energy d/t SOA experienced bending over.    Acceptance, E,TB, VU by SD at 3/9/2021 1057    Comment: Safety and sequencing during functional transfers.    Acceptance, E,TB, VU by SD at 3/8/2021 1459    Comment: Benefit of OT; OT POC                   Point: Home exercise program (Not Started)     Description:   Instruct learner(s) on appropriate technique for monitoring, assisting and/or progressing therapeutic exercises/activities.              Learner Progress:  Not documented in this visit.          Point: Precautions (Not Started)     Description:   Instruct learner(s) on prescribed precautions during self-care and functional transfers.              Learner Progress:  Not documented in this visit.          Point: Body mechanics (Not Started)     Description:   Instruct learner(s) on proper positioning and spine alignment during self-care, functional mobility activities and/or exercises.              Learner Progress:  Not documented in this visit.                      User Key     Initials Effective Dates Name Provider Type Discipline     03/07/18 -  Emani Stratton Occupational Therapist OT    SD 03/07/18 -  Lisa Salas OT Occupational Therapist OT              OT Recommendation and Plan     Plan of Care Review  Plan of Care Reviewed With: patient  Progress: improving  Outcome Summary: Pt received sitting up in her chair, pt modified independent to stand using RW, walked to bathroom independently using RW.  Pt able to perform toileting tasks independently.  Pt set up to don her shirt and pants.  Pt was left sitting up in her chair with call light within  reach.     Time Calculation:   Time Calculation- OT     Row Name 03/11/21 1243             Time Calculation- OT    OT Start Time  1219  -      OT Stop Time  1232  -      OT Time Calculation (min)  13 min  -      OT Received On  03/11/21  -      OT Goal Re-Cert Due Date  03/18/21  -         Timed Charges    27299 - OT Therapeutic Activity Minutes  13  -        User Key  (r) = Recorded By, (t) = Taken By, (c) = Cosigned By    Initials Name Provider Type     Emani Stratton Occupational Therapist        Therapy Charges for Today     Code Description Service Date Service Provider Modifiers Qty    [id number]  OT THERAPEUTIC ACT EA 15 MIN 3/11/2021 Emani Stratton GO 1               Emani Stratton  3/11/2021

## 2021-03-11 NOTE — PROGRESS NOTES
Continued Stay Note  RACHELE Bone     Patient Name: Cait Lu  MRN: [id number]  Today's Date: 3/11/2021    Admit Date: 3/7/2021    Discharge Plan     Row Name 03/11/21 1038       Plan    Plan Social Workers has spoke to pt about discharge and a place to go.  Pt assured  she has places to go.  Declined information on Homeless shelters. Has Medicaid (Passport) should qualify for Transport by [facility].    14:22 EST  Spoke to pt in room with Dr Rivera and was able to get in touch with her sister and she is coming to get her.  She will be staying with her sister.         Discharge Codes    No documentation.       Expected Discharge Date and Time     Expected Discharge Date Expected Discharge Time    Mar 10, 2021             Lily Haider RN

## 2021-03-11 NOTE — PLAN OF CARE
Goal Outcome Evaluation:        Outcome Summary: VSS.  No acute events noted.  PT ready for DC.  CM working with patient on DC plans.
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
## 2021-03-11 NOTE — PLAN OF CARE
Goal Outcome Evaluation:  Plan of Care Reviewed With: patient  Progress: improving  Outcome Summary: Pt received sitting up in her chair, pt modified independent to stand using RW, walked to bathroom independently using RW.  Pt able to perform toileting tasks independently.  Pt set up to don her shirt and pants.  Pt was left sitting up in her chair with call light within reach.

## 2021-03-12 ENCOUNTER — OFFICE VISIT (OUTPATIENT)
Dept: INTERNAL MEDICINE | Facility: CLINIC | Age: 61
End: 2021-03-12

## 2021-03-12 ENCOUNTER — TRANSITIONAL CARE MANAGEMENT TELEPHONE ENCOUNTER (OUTPATIENT)
Dept: CALL CENTER | Facility: HOSPITAL | Age: 61
End: 2021-03-12

## 2021-03-12 VITALS
HEART RATE: 66 BPM | HEIGHT: 64 IN | WEIGHT: 233.8 LBS | BODY MASS INDEX: 39.91 KG/M2 | DIASTOLIC BLOOD PRESSURE: 86 MMHG | SYSTOLIC BLOOD PRESSURE: 162 MMHG | TEMPERATURE: 96.7 F | OXYGEN SATURATION: 97 %

## 2021-03-12 DIAGNOSIS — I10 BENIGN ESSENTIAL HYPERTENSION: Primary | ICD-10-CM

## 2021-03-12 DIAGNOSIS — F19.10 POLYSUBSTANCE ABUSE (HCC): ICD-10-CM

## 2021-03-12 DIAGNOSIS — E78.00 HYPERCHOLESTEROLEMIA: ICD-10-CM

## 2021-03-12 DIAGNOSIS — E78.2 MULTIPLE-TYPE HYPERLIPIDEMIA: ICD-10-CM

## 2021-03-12 PROCEDURE — 99214 OFFICE O/P EST MOD 30 MIN: CPT | Performed by: NURSE PRACTITIONER

## 2021-03-12 RX ORDER — PANTOPRAZOLE SODIUM 40 MG/1
40 TABLET, DELAYED RELEASE ORAL
Qty: 90 TABLET | Refills: 0 | Status: SHIPPED | OUTPATIENT
Start: 2021-03-12

## 2021-03-12 RX ORDER — PRAVASTATIN SODIUM 80 MG/1
80 TABLET ORAL DAILY
Qty: 90 TABLET | Refills: 0 | Status: SHIPPED | OUTPATIENT
Start: 2021-03-12 | End: 2022-09-05 | Stop reason: HOSPADM

## 2021-03-12 RX ORDER — CYCLOBENZAPRINE HCL 10 MG
10 TABLET ORAL 3 TIMES DAILY PRN
Status: CANCELLED | OUTPATIENT
Start: 2021-03-12

## 2021-03-12 RX ORDER — CHLORAL HYDRATE 500 MG
CAPSULE ORAL
COMMUNITY
Start: 2020-12-30 | End: 2021-05-31 | Stop reason: HOSPADM

## 2021-03-12 RX ORDER — LISINOPRIL AND HYDROCHLOROTHIAZIDE 20; 12.5 MG/1; MG/1
1 TABLET ORAL DAILY
Qty: 90 TABLET | Refills: 0 | Status: SHIPPED | OUTPATIENT
Start: 2021-03-12 | End: 2022-09-05 | Stop reason: HOSPADM

## 2021-03-12 RX ORDER — EZETIMIBE 10 MG/1
10 TABLET ORAL DAILY
Qty: 90 TABLET | Refills: 0 | Status: ON HOLD | OUTPATIENT
Start: 2021-03-12 | End: 2022-09-02

## 2021-03-12 RX ORDER — GABAPENTIN 800 MG/1
1 TABLET ORAL 3 TIMES DAILY
COMMUNITY
Start: 2021-02-26

## 2021-03-12 RX ORDER — BUPROPION HYDROCHLORIDE 200 MG/1
200 TABLET, EXTENDED RELEASE ORAL DAILY
Status: CANCELLED | OUTPATIENT
Start: 2021-03-12

## 2021-03-12 RX ORDER — ESCITALOPRAM OXALATE 10 MG/1
TABLET ORAL
COMMUNITY
Start: 2020-12-30 | End: 2022-08-28 | Stop reason: ALTCHOICE

## 2021-03-12 RX ORDER — ESCITALOPRAM OXALATE 10 MG/1
TABLET ORAL
Status: CANCELLED | OUTPATIENT
Start: 2021-03-12

## 2021-03-12 NOTE — PROGRESS NOTES
Case Management Discharge Note                PT went home with her sister        Transportation Services  Private: Car    Final Discharge Disposition Code: 01 - home or self-care

## 2021-03-12 NOTE — OUTREACH NOTE
Call Center TCM Note      Responses   [facility] patient discharged from?  [facility]   Does the patient have one of the following disease processes/diagnoses(primary or secondary)?  Other   TCM attempt successful?  No   Revoked Reason  Other [has a completed hospital f/u appt.]          Jacqui Ramírez RN    3/12/2021, 14:12 EST

## 2021-03-12 NOTE — OUTREACH NOTE
Call Center TCM Note      Responses   [facility] patient discharged from?  [facility]   Does the patient have one of the following disease processes/diagnoses(primary or secondary)?  Other   TCM attempt successful?  No   Unsuccessful attempts  Attempt 1          Jacqui Ramírez RN    3/12/2021, 10:47 EST

## 2021-03-12 NOTE — OUTREACH NOTE
Prep Survey      Responses   [facility] facility patient discharged from?  [facility]   Is LACE score < 7 ?  No   Emergency Room discharge w/ pulse ox?  No   Eligibility  [facility]   Date of Admission  03/07/21   Date of Discharge  03/11/21   Discharge Disposition  Home or Self Care   Discharge diagnosis  Polysubstance abuse Suboxone withdrawal with severe altered mental status   Does the patient have one of the following disease processes/diagnoses(primary or secondary)?  Other   Does the patient have Home health ordered?  No   Is there a DME ordered?  No   Prep survey completed?  Yes          Leila Willis RN
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
## 2021-03-19 ENCOUNTER — READMISSION MANAGEMENT (OUTPATIENT)
Dept: CALL CENTER | Facility: HOSPITAL | Age: 61
End: 2021-03-19

## 2021-03-25 ENCOUNTER — READMISSION MANAGEMENT (OUTPATIENT)
Dept: CALL CENTER | Facility: HOSPITAL | Age: 61
End: 2021-03-25

## 2021-03-25 NOTE — OUTREACH NOTE
Medical Week 3 Survey      Responses   [facility] patient discharged from?  [facility]   Does the patient have one of the following disease processes/diagnoses(primary or secondary)?  Other   Week 3 attempt successful?  Yes   Call start time  1713   Call end time  1716   Discharge diagnosis  Polysubstance abuse Suboxone withdrawal with severe altered mental status   Is patient permission given to speak with other caregiver?  No   Meds reviewed with patient/caregiver?  Yes   Is the patient taking all medications as directed (includes completed medication regime)?  Yes   Does the patient have a primary care provider?   Yes   Comments regarding PCP  Patient states that she has switched PCP to [facility]   Has the patient kept scheduled appointments due by today?  Yes   Has home health visited the patient within 72 hours of discharge?  N/A   What is the patient's perception of their health status since discharge?  Improving   Is the patient/caregiver able to teach back the hierarchy of who to call/visit for symptoms/problems? PCP, Specialist, Home health nurse, Urgent Care, ED, 911  Yes   Week 3 Call Completed?  Yes   Wrap up additional comments  Patient states that she is feeling better. She denies any new questions or concerns today.           Leila Parisi RN

## 2021-04-05 ENCOUNTER — READMISSION MANAGEMENT (OUTPATIENT)
Dept: CALL CENTER | Facility: HOSPITAL | Age: 61
End: 2021-04-05

## 2021-04-05 NOTE — OUTREACH NOTE
Medical Week 4 Survey      Responses   [facility] patient discharged from?  [facility]   Does the patient have one of the following disease processes/diagnoses(primary or secondary)?  Other   Week 4 attempt successful?  Yes   Call start time  1006   Call end time  1007   Discharge diagnosis  Polysubstance abuse Suboxone withdrawal with severe altered mental status   Meds reviewed with patient/caregiver?  Yes   Is the patient having any side effects they believe may be caused by any medication additions or changes?  No   Is the patient taking all medications as directed (includes completed medication regime)?  Yes   Has the patient kept scheduled appointments due by today?  Yes   Is the patient still receiving Home Health Services?  N/A   Psychosocial issues?  No   What is the patient's perception of their health status since discharge?  Improving   Is the patient/caregiver able to teach back signs and symptoms related to disease process for when to call PCP?  Yes   Is the patient/caregiver able to teach back signs and symptoms related to disease process for when to call 911?  Yes   Is the patient/caregiver able to teach back the hierarchy of who to call/visit for symptoms/problems? PCP, Specialist, Home health nurse, Urgent Care, ED, 911  Yes   If the patient is a current smoker, are they able to teach back resources for cessation?  Not a smoker   Week 4 Call Completed?  Yes   Would the patient like one additional call?  No   Graduated  Yes   Did the patient feel the follow up calls were helpful during their recovery period?  No   Was the number of calls appropriate?  Yes          Osiris Robin RN

## 2021-05-28 ENCOUNTER — APPOINTMENT (OUTPATIENT)
Dept: CT IMAGING | Facility: HOSPITAL | Age: 61
End: 2021-05-28

## 2021-05-28 ENCOUNTER — HOSPITAL ENCOUNTER (INPATIENT)
Facility: HOSPITAL | Age: 61
LOS: 2 days | Discharge: HOME | End: 2021-05-31
Attending: EMERGENCY MEDICINE | Admitting: INTERNAL MEDICINE

## 2021-05-28 ENCOUNTER — APPOINTMENT (OUTPATIENT)
Dept: GENERAL RADIOLOGY | Facility: HOSPITAL | Age: 61
End: 2021-05-28

## 2021-05-28 DIAGNOSIS — R65.21 SEPTIC SHOCK (HCC): ICD-10-CM

## 2021-05-28 DIAGNOSIS — A41.9 SEPTIC SHOCK (HCC): ICD-10-CM

## 2021-05-28 DIAGNOSIS — N17.9 AKI (ACUTE KIDNEY INJURY) (HCC): ICD-10-CM

## 2021-05-28 DIAGNOSIS — K52.9 COLITIS: Primary | ICD-10-CM

## 2021-05-28 LAB
ALBUMIN SERPL-MCNC: 3.5 G/DL (ref 3.5–5.2)
ALBUMIN/GLOB SERPL: 1.1 G/DL
ALP SERPL-CCNC: 95 U/L (ref 39–117)
ALT SERPL W P-5'-P-CCNC: 7 U/L (ref 1–33)
ANION GAP SERPL CALCULATED.3IONS-SCNC: 14.8 MMOL/L (ref 5–15)
AST SERPL-CCNC: 14 U/L (ref 1–32)
BILIRUB SERPL-MCNC: 0.4 MG/DL (ref 0–1.2)
BILIRUB UR QL STRIP: NEGATIVE
BUN SERPL-MCNC: 51 MG/DL (ref 8–23)
BUN/CREAT SERPL: 18.8 (ref 7–25)
CALCIUM SPEC-SCNC: 9 MG/DL (ref 8.6–10.5)
CHLORIDE SERPL-SCNC: 99 MMOL/L (ref 98–107)
CLARITY UR: CLEAR
CO2 SERPL-SCNC: 17.2 MMOL/L (ref 22–29)
COLOR UR: YELLOW
CREAT SERPL-MCNC: 2.72 MG/DL (ref 0.57–1)
GFR SERPL CREATININE-BSD FRML MDRD: 18 ML/MIN/1.73
GLOBULIN UR ELPH-MCNC: 3.1 GM/DL
GLUCOSE SERPL-MCNC: 127 MG/DL (ref 65–99)
GLUCOSE UR STRIP-MCNC: NEGATIVE MG/DL
HGB UR QL STRIP.AUTO: NEGATIVE
KETONES UR QL STRIP: NEGATIVE
LEUKOCYTE ESTERASE UR QL STRIP.AUTO: NEGATIVE
NITRITE UR QL STRIP: NEGATIVE
PH UR STRIP.AUTO: <=5 [PH] (ref 5–8)
POTASSIUM SERPL-SCNC: 5.4 MMOL/L (ref 3.5–5.2)
PROCALCITONIN SERPL-MCNC: 2.65 NG/ML (ref 0–0.25)
PROT SERPL-MCNC: 6.6 G/DL (ref 6–8.5)
PROT UR QL STRIP: ABNORMAL
SODIUM SERPL-SCNC: 131 MMOL/L (ref 136–145)
SP GR UR STRIP: 1.02 (ref 1–1.03)
UROBILINOGEN UR QL STRIP: ABNORMAL

## 2021-05-28 PROCEDURE — 80061 LIPID PANEL: CPT | Performed by: INTERNAL MEDICINE

## 2021-05-28 PROCEDURE — 71045 X-RAY EXAM CHEST 1 VIEW: CPT

## 2021-05-28 PROCEDURE — 84145 PROCALCITONIN (PCT): CPT | Performed by: EMERGENCY MEDICINE

## 2021-05-28 PROCEDURE — 80053 COMPREHEN METABOLIC PANEL: CPT | Performed by: EMERGENCY MEDICINE

## 2021-05-28 PROCEDURE — 74176 CT ABD & PELVIS W/O CONTRAST: CPT

## 2021-05-28 PROCEDURE — 70450 CT HEAD/BRAIN W/O DYE: CPT

## 2021-05-28 PROCEDURE — 84100 ASSAY OF PHOSPHORUS: CPT | Performed by: INTERNAL MEDICINE

## 2021-05-28 PROCEDURE — 99284 EMERGENCY DEPT VISIT MOD MDM: CPT

## 2021-05-28 PROCEDURE — P9612 CATHETERIZE FOR URINE SPEC: HCPCS

## 2021-05-28 PROCEDURE — 93005 ELECTROCARDIOGRAM TRACING: CPT | Performed by: EMERGENCY MEDICINE

## 2021-05-28 PROCEDURE — 87040 BLOOD CULTURE FOR BACTERIA: CPT | Performed by: EMERGENCY MEDICINE

## 2021-05-28 PROCEDURE — 83735 ASSAY OF MAGNESIUM: CPT | Performed by: EMERGENCY MEDICINE

## 2021-05-28 PROCEDURE — 81003 URINALYSIS AUTO W/O SCOPE: CPT | Performed by: EMERGENCY MEDICINE

## 2021-05-28 RX ORDER — SODIUM CHLORIDE 0.9 % (FLUSH) 0.9 %
10 SYRINGE (ML) INJECTION AS NEEDED
Status: DISCONTINUED | OUTPATIENT
Start: 2021-05-28 | End: 2021-05-31 | Stop reason: HOSPADM

## 2021-05-28 RX ADMIN — SODIUM CHLORIDE 1000 ML: 9 INJECTION, SOLUTION INTRAVENOUS at 23:55

## 2021-05-28 RX ADMIN — SODIUM CHLORIDE 1641 ML: 9 INJECTION, SOLUTION INTRAVENOUS at 23:00

## 2021-05-29 ENCOUNTER — APPOINTMENT (OUTPATIENT)
Dept: GENERAL RADIOLOGY | Facility: HOSPITAL | Age: 61
End: 2021-05-29

## 2021-05-29 PROBLEM — K52.9 COLITIS: Status: ACTIVE | Noted: 2021-05-29

## 2021-05-29 PROBLEM — R65.10 SIRS (SYSTEMIC INFLAMMATORY RESPONSE SYNDROME) (HCC): Status: ACTIVE | Noted: 2021-05-29

## 2021-05-29 PROBLEM — N17.9 AKI (ACUTE KIDNEY INJURY) (HCC): Status: ACTIVE | Noted: 2021-05-29

## 2021-05-29 PROBLEM — I95.9 HYPOTENSION: Status: ACTIVE | Noted: 2021-05-29

## 2021-05-29 PROBLEM — R11.2 INTRACTABLE VOMITING WITH NAUSEA: Status: ACTIVE | Noted: 2021-05-29

## 2021-05-29 LAB
ADV 40+41 DNA STL QL NAA+NON-PROBE: NOT DETECTED
ANION GAP SERPL CALCULATED.3IONS-SCNC: 16.2 MMOL/L (ref 5–15)
ASTRO TYP 1-8 RNA STL QL NAA+NON-PROBE: NOT DETECTED
BASOPHILS # BLD AUTO: 0.03 10*3/MM3 (ref 0–0.2)
BASOPHILS # BLD AUTO: 0.03 10*3/MM3 (ref 0–0.2)
BASOPHILS NFR BLD AUTO: 0.2 % (ref 0–1.5)
BASOPHILS NFR BLD AUTO: 0.3 % (ref 0–1.5)
BUN SERPL-MCNC: 45 MG/DL (ref 8–23)
BUN/CREAT SERPL: 18.9 (ref 7–25)
C CAYETANENSIS DNA STL QL NAA+NON-PROBE: NOT DETECTED
C COLI+JEJ+UPSA DNA STL QL NAA+NON-PROBE: NOT DETECTED
C DIFF GDH STL QL: NEGATIVE
CALCIUM SPEC-SCNC: 8.9 MG/DL (ref 8.6–10.5)
CHLORIDE SERPL-SCNC: 107 MMOL/L (ref 98–107)
CHOLEST SERPL-MCNC: 162 MG/DL (ref 0–200)
CO2 SERPL-SCNC: 16.8 MMOL/L (ref 22–29)
CREAT SERPL-MCNC: 2.38 MG/DL (ref 0.57–1)
CRYPTOSP DNA STL QL NAA+NON-PROBE: NOT DETECTED
D-LACTATE SERPL-SCNC: 1.3 MMOL/L (ref 0.5–2)
DEPRECATED RDW RBC AUTO: 44.8 FL (ref 37–54)
DEPRECATED RDW RBC AUTO: 45.2 FL (ref 37–54)
E HISTOLYT DNA STL QL NAA+NON-PROBE: NOT DETECTED
EAEC PAA PLAS AGGR+AATA ST NAA+NON-PRB: NOT DETECTED
EC STX1+STX2 GENES STL QL NAA+NON-PROBE: NOT DETECTED
EOSINOPHIL # BLD AUTO: 0.01 10*3/MM3 (ref 0–0.4)
EOSINOPHIL # BLD AUTO: 0.02 10*3/MM3 (ref 0–0.4)
EOSINOPHIL NFR BLD AUTO: 0.1 % (ref 0.3–6.2)
EOSINOPHIL NFR BLD AUTO: 0.2 % (ref 0.3–6.2)
EPEC EAE GENE STL QL NAA+NON-PROBE: NOT DETECTED
ERYTHROCYTE [DISTWIDTH] IN BLOOD BY AUTOMATED COUNT: 13.2 % (ref 12.3–15.4)
ERYTHROCYTE [DISTWIDTH] IN BLOOD BY AUTOMATED COUNT: 13.3 % (ref 12.3–15.4)
ETEC LTA+ST1A+ST1B TOX ST NAA+NON-PROBE: NOT DETECTED
G LAMBLIA DNA STL QL NAA+NON-PROBE: NOT DETECTED
GFR SERPL CREATININE-BSD FRML MDRD: 21 ML/MIN/1.73
GLUCOSE SERPL-MCNC: 83 MG/DL (ref 65–99)
HCT VFR BLD AUTO: 29.7 % (ref 34–46.6)
HCT VFR BLD AUTO: 37.8 % (ref 34–46.6)
HDLC SERPL-MCNC: 60 MG/DL (ref 40–60)
HEMOCCULT STL QL: POSITIVE
HGB BLD-MCNC: 12.2 G/DL (ref 12–15.9)
HGB BLD-MCNC: 9.6 G/DL (ref 12–15.9)
HOLD SPECIMEN: NORMAL
IMM GRANULOCYTES # BLD AUTO: 0.04 10*3/MM3 (ref 0–0.05)
IMM GRANULOCYTES # BLD AUTO: 0.05 10*3/MM3 (ref 0–0.05)
IMM GRANULOCYTES NFR BLD AUTO: 0.4 % (ref 0–0.5)
IMM GRANULOCYTES NFR BLD AUTO: 0.4 % (ref 0–0.5)
LDLC SERPL CALC-MCNC: 86 MG/DL (ref 0–100)
LDLC/HDLC SERPL: 1.4 {RATIO}
LYMPHOCYTES # BLD AUTO: 1.29 10*3/MM3 (ref 0.7–3.1)
LYMPHOCYTES # BLD AUTO: 2 10*3/MM3 (ref 0.7–3.1)
LYMPHOCYTES NFR BLD AUTO: 11.8 % (ref 19.6–45.3)
LYMPHOCYTES NFR BLD AUTO: 15.7 % (ref 19.6–45.3)
MAGNESIUM SERPL-MCNC: 2.2 MG/DL (ref 1.6–2.4)
MCH RBC QN AUTO: 29.8 PG (ref 26.6–33)
MCH RBC QN AUTO: 30 PG (ref 26.6–33)
MCHC RBC AUTO-ENTMCNC: 32.3 G/DL (ref 31.5–35.7)
MCHC RBC AUTO-ENTMCNC: 32.3 G/DL (ref 31.5–35.7)
MCV RBC AUTO: 92.2 FL (ref 79–97)
MCV RBC AUTO: 93.1 FL (ref 79–97)
MONOCYTES # BLD AUTO: 0.65 10*3/MM3 (ref 0.1–0.9)
MONOCYTES # BLD AUTO: 0.87 10*3/MM3 (ref 0.1–0.9)
MONOCYTES NFR BLD AUTO: 5.9 % (ref 5–12)
MONOCYTES NFR BLD AUTO: 6.8 % (ref 5–12)
NEUTROPHILS NFR BLD AUTO: 76.8 % (ref 42.7–76)
NEUTROPHILS NFR BLD AUTO: 8.91 10*3/MM3 (ref 1.7–7)
NEUTROPHILS NFR BLD AUTO: 81.4 % (ref 42.7–76)
NEUTROPHILS NFR BLD AUTO: 9.76 10*3/MM3 (ref 1.7–7)
NOROVIRUS GI+II RNA STL QL NAA+NON-PROBE: NOT DETECTED
NRBC BLD AUTO-RTO: 0 /100 WBC (ref 0–0.2)
NRBC BLD AUTO-RTO: 0 /100 WBC (ref 0–0.2)
P SHIGELLOIDES DNA STL QL NAA+NON-PROBE: NOT DETECTED
PHOSPHATE SERPL-MCNC: 3.8 MG/DL (ref 2.5–4.5)
PLATELET # BLD AUTO: 154 10*3/MM3 (ref 140–450)
PLATELET # BLD AUTO: 230 10*3/MM3 (ref 140–450)
PMV BLD AUTO: 10.2 FL (ref 6–12)
PMV BLD AUTO: 10.2 FL (ref 6–12)
POTASSIUM SERPL-SCNC: 4.6 MMOL/L (ref 3.5–5.2)
RBC # BLD AUTO: 3.22 10*6/MM3 (ref 3.77–5.28)
RBC # BLD AUTO: 4.06 10*6/MM3 (ref 3.77–5.28)
RBC MORPH BLD: NORMAL
RVA RNA STL QL NAA+NON-PROBE: NOT DETECTED
S ENT+BONG DNA STL QL NAA+NON-PROBE: NOT DETECTED
SAPO I+II+IV+V RNA STL QL NAA+NON-PROBE: NOT DETECTED
SARS-COV-2 RNA PNL SPEC NAA+PROBE: NOT DETECTED
SHIGELLA SP+EIEC IPAH ST NAA+NON-PROBE: NOT DETECTED
SMALL PLATELETS BLD QL SMEAR: ADEQUATE
SODIUM SERPL-SCNC: 140 MMOL/L (ref 136–145)
TRIGL SERPL-MCNC: 89 MG/DL (ref 0–150)
V CHOL+PARA+VUL DNA STL QL NAA+NON-PROBE: NOT DETECTED
V CHOLERAE DNA STL QL NAA+NON-PROBE: NOT DETECTED
VLDLC SERPL-MCNC: 16 MG/DL (ref 5–40)
WBC # BLD AUTO: 10.94 10*3/MM3 (ref 3.4–10.8)
WBC # BLD AUTO: 12.72 10*3/MM3 (ref 3.4–10.8)
WBC MORPH BLD: NORMAL
WHOLE BLOOD HOLD SPECIMEN: NORMAL
Y ENTEROCOL DNA STL QL NAA+NON-PROBE: NOT DETECTED

## 2021-05-29 PROCEDURE — 25010000002 PIPERACILLIN SOD-TAZOBACTAM PER 1 G: Performed by: EMERGENCY MEDICINE

## 2021-05-29 PROCEDURE — 99223 1ST HOSP IP/OBS HIGH 75: CPT | Performed by: INTERNAL MEDICINE

## 2021-05-29 PROCEDURE — 85007 BL SMEAR W/DIFF WBC COUNT: CPT | Performed by: EMERGENCY MEDICINE

## 2021-05-29 PROCEDURE — 71045 X-RAY EXAM CHEST 1 VIEW: CPT

## 2021-05-29 PROCEDURE — 85025 COMPLETE CBC W/AUTO DIFF WBC: CPT | Performed by: EMERGENCY MEDICINE

## 2021-05-29 PROCEDURE — 25010000002 PIPERACILLIN SOD-TAZOBACTAM PER 1 G: Performed by: INTERNAL MEDICINE

## 2021-05-29 PROCEDURE — 25010000002 HEPARIN (PORCINE) PER 1000 UNITS: Performed by: INTERNAL MEDICINE

## 2021-05-29 PROCEDURE — 85025 COMPLETE CBC W/AUTO DIFF WBC: CPT | Performed by: INTERNAL MEDICINE

## 2021-05-29 PROCEDURE — 87449 NOS EACH ORGANISM AG IA: CPT | Performed by: FAMILY MEDICINE

## 2021-05-29 PROCEDURE — 83605 ASSAY OF LACTIC ACID: CPT | Performed by: EMERGENCY MEDICINE

## 2021-05-29 PROCEDURE — 87324 CLOSTRIDIUM AG IA: CPT | Performed by: FAMILY MEDICINE

## 2021-05-29 PROCEDURE — 80048 BASIC METABOLIC PNL TOTAL CA: CPT | Performed by: INTERNAL MEDICINE

## 2021-05-29 PROCEDURE — 0097U HC BIOFIRE FILMARRAY GI PANEL: CPT | Performed by: FAMILY MEDICINE

## 2021-05-29 PROCEDURE — 87635 SARS-COV-2 COVID-19 AMP PRB: CPT | Performed by: INTERNAL MEDICINE

## 2021-05-29 PROCEDURE — 82272 OCCULT BLD FECES 1-3 TESTS: CPT | Performed by: FAMILY MEDICINE

## 2021-05-29 PROCEDURE — 25010000002 HEPARIN (PORCINE) PER 1000 UNITS: Performed by: FAMILY MEDICINE

## 2021-05-29 PROCEDURE — 25010000002 PIPERACILLIN SOD-TAZOBACTAM PER 1 G: Performed by: FAMILY MEDICINE

## 2021-05-29 RX ORDER — SODIUM CHLORIDE 0.9 % (FLUSH) 0.9 %
10 SYRINGE (ML) INJECTION AS NEEDED
Status: DISCONTINUED | OUTPATIENT
Start: 2021-05-29 | End: 2021-05-31 | Stop reason: HOSPADM

## 2021-05-29 RX ORDER — PRAVASTATIN SODIUM 20 MG
80 TABLET ORAL DAILY
Status: DISCONTINUED | OUTPATIENT
Start: 2021-05-29 | End: 2021-05-31 | Stop reason: HOSPADM

## 2021-05-29 RX ORDER — L.ACID,PARA/B.BIFIDUM/S.THERM 8B CELL
1 CAPSULE ORAL DAILY
Status: DISCONTINUED | OUTPATIENT
Start: 2021-05-29 | End: 2021-05-31 | Stop reason: HOSPADM

## 2021-05-29 RX ORDER — OXYCODONE AND ACETAMINOPHEN 10; 325 MG/1; MG/1
1 TABLET ORAL EVERY 6 HOURS PRN
Status: DISCONTINUED | OUTPATIENT
Start: 2021-05-29 | End: 2021-05-29

## 2021-05-29 RX ORDER — IPRATROPIUM BROMIDE AND ALBUTEROL SULFATE 2.5; .5 MG/3ML; MG/3ML
3 SOLUTION RESPIRATORY (INHALATION) EVERY 4 HOURS PRN
Status: DISCONTINUED | OUTPATIENT
Start: 2021-05-29 | End: 2021-05-31 | Stop reason: HOSPADM

## 2021-05-29 RX ORDER — TRAMADOL HYDROCHLORIDE 50 MG/1
50 TABLET ORAL EVERY 12 HOURS PRN
Status: CANCELLED | OUTPATIENT
Start: 2021-05-29 | End: 2021-06-05

## 2021-05-29 RX ORDER — PANTOPRAZOLE SODIUM 40 MG/10ML
40 INJECTION, POWDER, LYOPHILIZED, FOR SOLUTION INTRAVENOUS
Status: DISCONTINUED | OUTPATIENT
Start: 2021-05-29 | End: 2021-05-29

## 2021-05-29 RX ORDER — CYCLOBENZAPRINE HCL 10 MG
10 TABLET ORAL 3 TIMES DAILY PRN
Status: DISCONTINUED | OUTPATIENT
Start: 2021-05-29 | End: 2021-05-31 | Stop reason: HOSPADM

## 2021-05-29 RX ORDER — QUETIAPINE FUMARATE 100 MG/1
300 TABLET, FILM COATED ORAL NIGHTLY
Status: DISCONTINUED | OUTPATIENT
Start: 2021-05-29 | End: 2021-05-29

## 2021-05-29 RX ORDER — SODIUM CHLORIDE 9 MG/ML
75 INJECTION, SOLUTION INTRAVENOUS CONTINUOUS
Status: DISCONTINUED | OUTPATIENT
Start: 2021-05-29 | End: 2021-05-30

## 2021-05-29 RX ORDER — BUPRENORPHINE AND NALOXONE 8; 2 MG/1; MG/1
1 FILM, SOLUBLE BUCCAL; SUBLINGUAL DAILY
Status: DISCONTINUED | OUTPATIENT
Start: 2021-05-29 | End: 2021-05-30

## 2021-05-29 RX ORDER — ESCITALOPRAM OXALATE 10 MG/1
10 TABLET ORAL DAILY
Status: DISCONTINUED | OUTPATIENT
Start: 2021-05-29 | End: 2021-05-31 | Stop reason: HOSPADM

## 2021-05-29 RX ORDER — GABAPENTIN 400 MG/1
400 CAPSULE ORAL EVERY 8 HOURS SCHEDULED
Status: DISCONTINUED | OUTPATIENT
Start: 2021-05-29 | End: 2021-05-31 | Stop reason: HOSPADM

## 2021-05-29 RX ORDER — QUETIAPINE FUMARATE 100 MG/1
100 TABLET, FILM COATED ORAL NIGHTLY
Status: DISCONTINUED | OUTPATIENT
Start: 2021-05-29 | End: 2021-05-31 | Stop reason: HOSPADM

## 2021-05-29 RX ORDER — HEPARIN SODIUM 5000 [USP'U]/ML
5000 INJECTION, SOLUTION INTRAVENOUS; SUBCUTANEOUS EVERY 8 HOURS SCHEDULED
Status: DISCONTINUED | OUTPATIENT
Start: 2021-05-29 | End: 2021-05-31 | Stop reason: HOSPADM

## 2021-05-29 RX ORDER — ACETAMINOPHEN 325 MG/1
650 TABLET ORAL EVERY 6 HOURS PRN
Status: DISCONTINUED | OUTPATIENT
Start: 2021-05-29 | End: 2021-05-31 | Stop reason: HOSPADM

## 2021-05-29 RX ORDER — NORTRIPTYLINE HYDROCHLORIDE 10 MG/1
10 CAPSULE ORAL NIGHTLY
Status: DISCONTINUED | OUTPATIENT
Start: 2021-05-29 | End: 2021-05-31 | Stop reason: HOSPADM

## 2021-05-29 RX ORDER — BUPROPION HYDROCHLORIDE 100 MG/1
200 TABLET, EXTENDED RELEASE ORAL DAILY
Status: DISCONTINUED | OUTPATIENT
Start: 2021-05-29 | End: 2021-05-31 | Stop reason: HOSPADM

## 2021-05-29 RX ORDER — SODIUM CHLORIDE 0.9 % (FLUSH) 0.9 %
10 SYRINGE (ML) INJECTION EVERY 12 HOURS SCHEDULED
Status: DISCONTINUED | OUTPATIENT
Start: 2021-05-29 | End: 2021-05-31 | Stop reason: HOSPADM

## 2021-05-29 RX ORDER — NOREPINEPHRINE BIT/0.9 % NACL 8 MG/250ML
.02-.3 INFUSION BOTTLE (ML) INTRAVENOUS
Status: DISCONTINUED | OUTPATIENT
Start: 2021-05-29 | End: 2021-05-29

## 2021-05-29 RX ORDER — LABETALOL HYDROCHLORIDE 5 MG/ML
10 INJECTION, SOLUTION INTRAVENOUS EVERY 6 HOURS PRN
Status: DISCONTINUED | OUTPATIENT
Start: 2021-05-29 | End: 2021-05-31 | Stop reason: HOSPADM

## 2021-05-29 RX ORDER — ONDANSETRON 2 MG/ML
4 INJECTION INTRAMUSCULAR; INTRAVENOUS EVERY 6 HOURS PRN
Status: DISCONTINUED | OUTPATIENT
Start: 2021-05-29 | End: 2021-05-31 | Stop reason: HOSPADM

## 2021-05-29 RX ORDER — MORPHINE SULFATE 2 MG/ML
1 INJECTION, SOLUTION INTRAMUSCULAR; INTRAVENOUS EVERY 4 HOURS PRN
Status: DISCONTINUED | OUTPATIENT
Start: 2021-05-29 | End: 2021-05-29

## 2021-05-29 RX ADMIN — HEPARIN SODIUM 5000 UNITS: 5000 INJECTION, SOLUTION INTRAVENOUS; SUBCUTANEOUS at 15:07

## 2021-05-29 RX ADMIN — QUETIAPINE FUMARATE 100 MG: 100 TABLET ORAL at 21:16

## 2021-05-29 RX ADMIN — TAZOBACTAM SODIUM AND PIPERACILLIN SODIUM 3.38 G: 375; 3 INJECTION, SOLUTION INTRAVENOUS at 09:01

## 2021-05-29 RX ADMIN — SODIUM CHLORIDE, PRESERVATIVE FREE 10 ML: 5 INJECTION INTRAVENOUS at 21:27

## 2021-05-29 RX ADMIN — SODIUM CHLORIDE 75 ML/HR: 9 INJECTION, SOLUTION INTRAVENOUS at 03:11

## 2021-05-29 RX ADMIN — GABAPENTIN 400 MG: 400 CAPSULE ORAL at 15:06

## 2021-05-29 RX ADMIN — GABAPENTIN 400 MG: 400 CAPSULE ORAL at 05:12

## 2021-05-29 RX ADMIN — SODIUM CHLORIDE 500 ML: 9 INJECTION, SOLUTION INTRAVENOUS at 08:44

## 2021-05-29 RX ADMIN — SODIUM CHLORIDE, PRESERVATIVE FREE 10 ML: 5 INJECTION INTRAVENOUS at 08:44

## 2021-05-29 RX ADMIN — BUPROPION HYDROCHLORIDE 200 MG: 100 TABLET, FILM COATED, EXTENDED RELEASE ORAL at 08:44

## 2021-05-29 RX ADMIN — TAZOBACTAM SODIUM AND PIPERACILLIN SODIUM 3.38 G: 375; 3 INJECTION, SOLUTION INTRAVENOUS at 23:48

## 2021-05-29 RX ADMIN — Medication 1 CAPSULE: at 11:36

## 2021-05-29 RX ADMIN — ESCITALOPRAM OXALATE 10 MG: 10 TABLET ORAL at 08:44

## 2021-05-29 RX ADMIN — PANTOPRAZOLE SODIUM 40 MG: 40 INJECTION, POWDER, FOR SOLUTION INTRAVENOUS at 05:13

## 2021-05-29 RX ADMIN — GABAPENTIN 400 MG: 400 CAPSULE ORAL at 21:16

## 2021-05-29 RX ADMIN — SODIUM CHLORIDE 75 ML/HR: 9 INJECTION, SOLUTION INTRAVENOUS at 15:19

## 2021-05-29 RX ADMIN — SODIUM CHLORIDE, PRESERVATIVE FREE 10 ML: 5 INJECTION INTRAVENOUS at 08:45

## 2021-05-29 RX ADMIN — Medication 0.02 MCG/KG/MIN: at 01:28

## 2021-05-29 RX ADMIN — OXYCODONE HYDROCHLORIDE AND ACETAMINOPHEN 1 TABLET: 10; 325 TABLET ORAL at 08:49

## 2021-05-29 RX ADMIN — TAZOBACTAM SODIUM AND PIPERACILLIN SODIUM 3.38 G: 375; 3 INJECTION, SOLUTION INTRAVENOUS at 15:06

## 2021-05-29 RX ADMIN — PRAVASTATIN SODIUM 80 MG: 20 TABLET ORAL at 08:44

## 2021-05-29 RX ADMIN — HEPARIN SODIUM 5000 UNITS: 5000 INJECTION, SOLUTION INTRAVENOUS; SUBCUTANEOUS at 21:16

## 2021-05-29 RX ADMIN — BUPRENORPHINE AND NALOXONE 1 FILM: 8; 2 FILM BUCCAL; SUBLINGUAL at 11:36

## 2021-05-29 RX ADMIN — TAZOBACTAM SODIUM AND PIPERACILLIN SODIUM 4.5 G: 500; 4 INJECTION, SOLUTION INTRAVENOUS at 00:12

## 2021-05-29 RX ADMIN — NORTRIPTYLINE HYDROCHLORIDE 10 MG: 10 CAPSULE ORAL at 21:27

## 2021-05-29 RX ADMIN — HEPARIN SODIUM 5000 UNITS: 5000 INJECTION, SOLUTION INTRAVENOUS; SUBCUTANEOUS at 05:12

## 2021-05-30 LAB
ALBUMIN SERPL-MCNC: 3.3 G/DL (ref 3.5–5.2)
ALBUMIN/GLOB SERPL: 1.1 G/DL
ALP SERPL-CCNC: 83 U/L (ref 39–117)
ALT SERPL W P-5'-P-CCNC: 6 U/L (ref 1–33)
ANION GAP SERPL CALCULATED.3IONS-SCNC: 10.8 MMOL/L (ref 5–15)
AST SERPL-CCNC: 9 U/L (ref 1–32)
BASOPHILS # BLD AUTO: 0.04 10*3/MM3 (ref 0–0.2)
BASOPHILS NFR BLD AUTO: 0.6 % (ref 0–1.5)
BILIRUB SERPL-MCNC: 0.3 MG/DL (ref 0–1.2)
BUN SERPL-MCNC: 17 MG/DL (ref 8–23)
BUN/CREAT SERPL: 14.2 (ref 7–25)
CALCIUM SPEC-SCNC: 8.9 MG/DL (ref 8.6–10.5)
CHLORIDE SERPL-SCNC: 109 MMOL/L (ref 98–107)
CO2 SERPL-SCNC: 21.2 MMOL/L (ref 22–29)
CREAT SERPL-MCNC: 1.2 MG/DL (ref 0.57–1)
DEPRECATED RDW RBC AUTO: 43.6 FL (ref 37–54)
EOSINOPHIL # BLD AUTO: 0.13 10*3/MM3 (ref 0–0.4)
EOSINOPHIL NFR BLD AUTO: 1.9 % (ref 0.3–6.2)
ERYTHROCYTE [DISTWIDTH] IN BLOOD BY AUTOMATED COUNT: 13 % (ref 12.3–15.4)
GFR SERPL CREATININE-BSD FRML MDRD: 46 ML/MIN/1.73
GLOBULIN UR ELPH-MCNC: 3 GM/DL
GLUCOSE BLDC GLUCOMTR-MCNC: 117 MG/DL (ref 70–130)
GLUCOSE SERPL-MCNC: 112 MG/DL (ref 65–99)
HCT VFR BLD AUTO: 33.2 % (ref 34–46.6)
HGB BLD-MCNC: 10.9 G/DL (ref 12–15.9)
IMM GRANULOCYTES # BLD AUTO: 0.03 10*3/MM3 (ref 0–0.05)
IMM GRANULOCYTES NFR BLD AUTO: 0.4 % (ref 0–0.5)
LYMPHOCYTES # BLD AUTO: 0.87 10*3/MM3 (ref 0.7–3.1)
LYMPHOCYTES NFR BLD AUTO: 12.6 % (ref 19.6–45.3)
MCH RBC QN AUTO: 30.1 PG (ref 26.6–33)
MCHC RBC AUTO-ENTMCNC: 32.8 G/DL (ref 31.5–35.7)
MCV RBC AUTO: 91.7 FL (ref 79–97)
MONOCYTES # BLD AUTO: 0.4 10*3/MM3 (ref 0.1–0.9)
MONOCYTES NFR BLD AUTO: 5.8 % (ref 5–12)
NEUTROPHILS NFR BLD AUTO: 5.41 10*3/MM3 (ref 1.7–7)
NEUTROPHILS NFR BLD AUTO: 78.7 % (ref 42.7–76)
NRBC BLD AUTO-RTO: 0 /100 WBC (ref 0–0.2)
PLATELET # BLD AUTO: 175 10*3/MM3 (ref 140–450)
PMV BLD AUTO: 9.3 FL (ref 6–12)
POTASSIUM SERPL-SCNC: 4 MMOL/L (ref 3.5–5.2)
PROT SERPL-MCNC: 6.3 G/DL (ref 6–8.5)
RBC # BLD AUTO: 3.62 10*6/MM3 (ref 3.77–5.28)
SODIUM SERPL-SCNC: 141 MMOL/L (ref 136–145)
WBC # BLD AUTO: 6.88 10*3/MM3 (ref 3.4–10.8)

## 2021-05-30 PROCEDURE — 25010000002 HEPARIN (PORCINE) PER 1000 UNITS: Performed by: FAMILY MEDICINE

## 2021-05-30 PROCEDURE — 99232 SBSQ HOSP IP/OBS MODERATE 35: CPT | Performed by: FAMILY MEDICINE

## 2021-05-30 PROCEDURE — 80053 COMPREHEN METABOLIC PANEL: CPT | Performed by: FAMILY MEDICINE

## 2021-05-30 PROCEDURE — 82962 GLUCOSE BLOOD TEST: CPT

## 2021-05-30 PROCEDURE — 85025 COMPLETE CBC W/AUTO DIFF WBC: CPT | Performed by: FAMILY MEDICINE

## 2021-05-30 PROCEDURE — 25010000002 PIPERACILLIN SOD-TAZOBACTAM PER 1 G: Performed by: FAMILY MEDICINE

## 2021-05-30 RX ORDER — BUPRENORPHINE AND NALOXONE 8; 2 MG/1; MG/1
2 FILM, SOLUBLE BUCCAL; SUBLINGUAL DAILY
Status: DISCONTINUED | OUTPATIENT
Start: 2021-05-30 | End: 2021-05-31 | Stop reason: HOSPADM

## 2021-05-30 RX ORDER — LEVOFLOXACIN 500 MG/1
500 TABLET, FILM COATED ORAL EVERY 24 HOURS
Status: DISCONTINUED | OUTPATIENT
Start: 2021-05-30 | End: 2021-05-31 | Stop reason: HOSPADM

## 2021-05-30 RX ORDER — METRONIDAZOLE 500 MG/1
500 TABLET ORAL EVERY 8 HOURS SCHEDULED
Status: DISCONTINUED | OUTPATIENT
Start: 2021-05-30 | End: 2021-05-31 | Stop reason: HOSPADM

## 2021-05-30 RX ADMIN — NORTRIPTYLINE HYDROCHLORIDE 10 MG: 10 CAPSULE ORAL at 21:29

## 2021-05-30 RX ADMIN — SODIUM CHLORIDE 75 ML/HR: 9 INJECTION, SOLUTION INTRAVENOUS at 04:44

## 2021-05-30 RX ADMIN — SODIUM CHLORIDE, PRESERVATIVE FREE 10 ML: 5 INJECTION INTRAVENOUS at 21:30

## 2021-05-30 RX ADMIN — ESCITALOPRAM OXALATE 10 MG: 10 TABLET ORAL at 08:27

## 2021-05-30 RX ADMIN — HEPARIN SODIUM 5000 UNITS: 5000 INJECTION, SOLUTION INTRAVENOUS; SUBCUTANEOUS at 05:36

## 2021-05-30 RX ADMIN — SODIUM CHLORIDE, PRESERVATIVE FREE 10 ML: 5 INJECTION INTRAVENOUS at 21:31

## 2021-05-30 RX ADMIN — SODIUM CHLORIDE, PRESERVATIVE FREE 10 ML: 5 INJECTION INTRAVENOUS at 08:28

## 2021-05-30 RX ADMIN — Medication 1 CAPSULE: at 08:27

## 2021-05-30 RX ADMIN — QUETIAPINE FUMARATE 100 MG: 100 TABLET ORAL at 21:29

## 2021-05-30 RX ADMIN — GABAPENTIN 400 MG: 400 CAPSULE ORAL at 14:35

## 2021-05-30 RX ADMIN — GABAPENTIN 400 MG: 400 CAPSULE ORAL at 05:36

## 2021-05-30 RX ADMIN — HEPARIN SODIUM 5000 UNITS: 5000 INJECTION, SOLUTION INTRAVENOUS; SUBCUTANEOUS at 21:29

## 2021-05-30 RX ADMIN — METRONIDAZOLE 500 MG: 500 TABLET ORAL at 15:00

## 2021-05-30 RX ADMIN — SODIUM CHLORIDE, PRESERVATIVE FREE 10 ML: 5 INJECTION INTRAVENOUS at 08:29

## 2021-05-30 RX ADMIN — LEVOFLOXACIN 500 MG: 500 TABLET, FILM COATED ORAL at 15:00

## 2021-05-30 RX ADMIN — METRONIDAZOLE 500 MG: 500 TABLET ORAL at 21:29

## 2021-05-30 RX ADMIN — PRAVASTATIN SODIUM 80 MG: 20 TABLET ORAL at 08:27

## 2021-05-30 RX ADMIN — GABAPENTIN 400 MG: 400 CAPSULE ORAL at 21:29

## 2021-05-30 RX ADMIN — TAZOBACTAM SODIUM AND PIPERACILLIN SODIUM 3.38 G: 375; 3 INJECTION, SOLUTION INTRAVENOUS at 08:27

## 2021-05-30 RX ADMIN — HEPARIN SODIUM 5000 UNITS: 5000 INJECTION, SOLUTION INTRAVENOUS; SUBCUTANEOUS at 15:00

## 2021-05-30 RX ADMIN — BUPROPION HYDROCHLORIDE 200 MG: 100 TABLET, FILM COATED, EXTENDED RELEASE ORAL at 08:29

## 2021-05-30 RX ADMIN — BUPRENORPHINE AND NALOXONE 2 FILM: 8; 2 FILM BUCCAL; SUBLINGUAL at 08:27

## 2021-05-31 VITALS
TEMPERATURE: 97.7 F | DIASTOLIC BLOOD PRESSURE: 65 MMHG | RESPIRATION RATE: 16 BRPM | WEIGHT: 231.26 LBS | HEIGHT: 65 IN | HEART RATE: 55 BPM | SYSTOLIC BLOOD PRESSURE: 121 MMHG | BODY MASS INDEX: 38.53 KG/M2 | OXYGEN SATURATION: 98 %

## 2021-05-31 PROBLEM — N17.9 ACUTE RENAL FAILURE (ARF) (HCC): Status: RESOLVED | Noted: 2021-05-31 | Resolved: 2021-05-31

## 2021-05-31 PROBLEM — R11.2 INTRACTABLE VOMITING WITH NAUSEA: Status: RESOLVED | Noted: 2021-05-29 | Resolved: 2021-05-31

## 2021-05-31 PROBLEM — A09 INFECTIVE COLITIS: Status: ACTIVE | Noted: 2021-05-29

## 2021-05-31 PROBLEM — N17.9 ACUTE RENAL FAILURE (ARF): Status: ACTIVE | Noted: 2021-05-31

## 2021-05-31 PROBLEM — F11.20 OPIATE DEPENDENCE (HCC): Status: ACTIVE | Noted: 2021-05-31

## 2021-05-31 LAB
ALBUMIN SERPL-MCNC: 3.6 G/DL (ref 3.5–5.2)
ALBUMIN/GLOB SERPL: 1.3 G/DL
ALP SERPL-CCNC: 82 U/L (ref 39–117)
ALT SERPL W P-5'-P-CCNC: <5 U/L (ref 1–33)
ANION GAP SERPL CALCULATED.3IONS-SCNC: 11.7 MMOL/L (ref 5–15)
AST SERPL-CCNC: 11 U/L (ref 1–32)
BASOPHILS # BLD AUTO: 0.04 10*3/MM3 (ref 0–0.2)
BASOPHILS NFR BLD AUTO: 0.8 % (ref 0–1.5)
BILIRUB SERPL-MCNC: 0.2 MG/DL (ref 0–1.2)
BUN SERPL-MCNC: 9 MG/DL (ref 8–23)
BUN/CREAT SERPL: 9.1 (ref 7–25)
CALCIUM SPEC-SCNC: 9.2 MG/DL (ref 8.6–10.5)
CHLORIDE SERPL-SCNC: 107 MMOL/L (ref 98–107)
CO2 SERPL-SCNC: 22.3 MMOL/L (ref 22–29)
CREAT SERPL-MCNC: 0.99 MG/DL (ref 0.57–1)
DEPRECATED RDW RBC AUTO: 42.1 FL (ref 37–54)
EOSINOPHIL # BLD AUTO: 0.26 10*3/MM3 (ref 0–0.4)
EOSINOPHIL NFR BLD AUTO: 5 % (ref 0.3–6.2)
ERYTHROCYTE [DISTWIDTH] IN BLOOD BY AUTOMATED COUNT: 12.8 % (ref 12.3–15.4)
GFR SERPL CREATININE-BSD FRML MDRD: 57 ML/MIN/1.73
GLOBULIN UR ELPH-MCNC: 2.7 GM/DL
GLUCOSE SERPL-MCNC: 82 MG/DL (ref 65–99)
HCT VFR BLD AUTO: 32.6 % (ref 34–46.6)
HGB BLD-MCNC: 10.7 G/DL (ref 12–15.9)
IMM GRANULOCYTES # BLD AUTO: 0.06 10*3/MM3 (ref 0–0.05)
IMM GRANULOCYTES NFR BLD AUTO: 1.1 % (ref 0–0.5)
LYMPHOCYTES # BLD AUTO: 1.72 10*3/MM3 (ref 0.7–3.1)
LYMPHOCYTES NFR BLD AUTO: 32.9 % (ref 19.6–45.3)
MCH RBC QN AUTO: 30.1 PG (ref 26.6–33)
MCHC RBC AUTO-ENTMCNC: 32.8 G/DL (ref 31.5–35.7)
MCV RBC AUTO: 91.6 FL (ref 79–97)
MONOCYTES # BLD AUTO: 0.44 10*3/MM3 (ref 0.1–0.9)
MONOCYTES NFR BLD AUTO: 8.4 % (ref 5–12)
NEUTROPHILS NFR BLD AUTO: 2.71 10*3/MM3 (ref 1.7–7)
NEUTROPHILS NFR BLD AUTO: 51.8 % (ref 42.7–76)
NRBC BLD AUTO-RTO: 0 /100 WBC (ref 0–0.2)
PLATELET # BLD AUTO: 118 10*3/MM3 (ref 140–450)
PMV BLD AUTO: 10.9 FL (ref 6–12)
POTASSIUM SERPL-SCNC: 4.1 MMOL/L (ref 3.5–5.2)
PROT SERPL-MCNC: 6.3 G/DL (ref 6–8.5)
RBC # BLD AUTO: 3.56 10*6/MM3 (ref 3.77–5.28)
SODIUM SERPL-SCNC: 141 MMOL/L (ref 136–145)
WBC # BLD AUTO: 5.23 10*3/MM3 (ref 3.4–10.8)

## 2021-05-31 PROCEDURE — 80053 COMPREHEN METABOLIC PANEL: CPT | Performed by: FAMILY MEDICINE

## 2021-05-31 PROCEDURE — 85025 COMPLETE CBC W/AUTO DIFF WBC: CPT | Performed by: FAMILY MEDICINE

## 2021-05-31 PROCEDURE — 99238 HOSP IP/OBS DSCHRG MGMT 30/<: CPT | Performed by: INTERNAL MEDICINE

## 2021-05-31 PROCEDURE — 25010000002 HEPARIN (PORCINE) PER 1000 UNITS: Performed by: FAMILY MEDICINE

## 2021-05-31 RX ORDER — LEVOFLOXACIN 750 MG/1
750 TABLET ORAL EVERY 24 HOURS
Qty: 5 TABLET | Refills: 0 | Status: SHIPPED | OUTPATIENT
Start: 2021-05-31 | End: 2021-06-05

## 2021-05-31 RX ORDER — METRONIDAZOLE 500 MG/1
500 TABLET ORAL 3 TIMES DAILY
Qty: 15 TABLET | Refills: 0 | Status: SHIPPED | OUTPATIENT
Start: 2021-05-31 | End: 2021-06-05

## 2021-05-31 RX ADMIN — BUPRENORPHINE AND NALOXONE 2 FILM: 8; 2 FILM BUCCAL; SUBLINGUAL at 10:00

## 2021-05-31 RX ADMIN — GABAPENTIN 400 MG: 400 CAPSULE ORAL at 05:50

## 2021-05-31 RX ADMIN — PRAVASTATIN SODIUM 80 MG: 20 TABLET ORAL at 10:00

## 2021-05-31 RX ADMIN — METRONIDAZOLE 500 MG: 500 TABLET ORAL at 05:50

## 2021-05-31 RX ADMIN — HEPARIN SODIUM 5000 UNITS: 5000 INJECTION, SOLUTION INTRAVENOUS; SUBCUTANEOUS at 05:50

## 2021-05-31 RX ADMIN — SODIUM CHLORIDE, PRESERVATIVE FREE 10 ML: 5 INJECTION INTRAVENOUS at 10:00

## 2021-05-31 RX ADMIN — Medication 1 CAPSULE: at 10:00

## 2021-05-31 RX ADMIN — BUPROPION HYDROCHLORIDE 200 MG: 100 TABLET, FILM COATED, EXTENDED RELEASE ORAL at 10:00

## 2021-05-31 RX ADMIN — ESCITALOPRAM OXALATE 10 MG: 10 TABLET ORAL at 10:00

## 2021-06-01 ENCOUNTER — READMISSION MANAGEMENT (OUTPATIENT)
Dept: CALL CENTER | Facility: HOSPITAL | Age: 61
End: 2021-06-01

## 2021-06-01 NOTE — OUTREACH NOTE
Prep Survey      Responses   [facility]   Is LACE score < 7 ?  No   Emergency Room discharge w/ pulse ox?  No   Eligibility  Readm Mgmt   Discharge diagnosis  Acute renal failure Infective colitis    Does the patient have one of the following disease processes/diagnoses(primary or secondary)?  Other   Does the patient have Home health ordered?  No   Is there a DME ordered?  No   Prep survey completed?  Yes          Natalee Churchill RN

## 2021-06-02 ENCOUNTER — READMISSION MANAGEMENT (OUTPATIENT)
Dept: CALL CENTER | Facility: HOSPITAL | Age: 61
End: 2021-06-02

## 2021-06-02 LAB
BACTERIA SPEC AEROBE CULT: NORMAL
BACTERIA SPEC AEROBE CULT: NORMAL

## 2021-06-02 NOTE — OUTREACH NOTE
Medical Week 1 Survey      Responses   [facility] patient discharged from?  [facility]   Does the patient have one of the following disease processes/diagnoses(primary or secondary)?  Other   Week 1 attempt successful?  Yes   Call start time  1329   Call end time  1336   Is patient permission given to speak with other caregiver?  Yes   List who call center can speak with  Any emergency contact.   Meds reviewed with patient/caregiver?  Yes   Is the patient having any side effects they believe may be caused by any medication additions or changes?  No   Does the patient have all medications ordered at discharge?  Yes   Is the patient taking all medications as directed (includes completed medication regime)?  Yes   Does the patient have a primary care provider?   Yes   Does the patient have an appointment with their PCP within 7 days of discharge?  No   What is preventing the patient from scheduling follow up appointments within 7 days of discharge?  Haven't had time   Nursing Interventions  Advised patient to make appointment, Educated patient on importance of making appointment   Has the patient kept scheduled appointments due by today?  N/A   Has home health visited the patient within 72 hours of discharge?  N/A   Psychosocial issues?  No   Did the patient receive a copy of their discharge instructions?  Yes   Nursing interventions  Reviewed instructions with patient   What is the patient's perception of their health status since discharge?  Improving   Is the patient/caregiver able to teach back signs and symptoms related to disease process for when to call PCP?  Yes   Is the patient/caregiver able to teach back signs and symptoms related to disease process for when to call 911?  Yes   Is the patient/caregiver able to teach back the hierarchy of who to call/visit for symptoms/problems? PCP, Specialist, Home health nurse, Urgent Care, ED, 911  Yes   Additional teach back comments  Reviewed s/s of worsening  colitis.   Week 1 call completed?  Yes   Wrap up additional comments  States is feeling better-denies any fever, diarrhea, or abdominal pain. States appetite good. States had wonderful care by her nurses. Denies any needs today.          Patrica Colunga RN

## 2021-06-08 ENCOUNTER — READMISSION MANAGEMENT (OUTPATIENT)
Dept: CALL CENTER | Facility: HOSPITAL | Age: 61
End: 2021-06-08

## 2021-06-08 NOTE — OUTREACH NOTE
Medical Week 2 Survey      Responses   [facility] patient discharged from?  [facility]   Does the patient have one of the following disease processes/diagnoses(primary or secondary)?  Other   Week 2 attempt successful?  Yes   Call start time  1309   Discharge diagnosis  Acute renal failure Infective colitis    Call end time  1316   List who call center can speak with  Any emergency contact.   Meds reviewed with patient/caregiver?  Yes   Is the patient having any side effects they believe may be caused by any medication additions or changes?  No   Does the patient have all medications ordered at discharge?  Yes   Is the patient taking all medications as directed (includes completed medication regime)?  Yes   Medication comments  Finished antibiotics a couple of days ago   Does the patient have a primary care provider?   Yes   Does the patient have an appointment with their PCP within 7 days of discharge?  No   Comments regarding PCP  States she has been trying to make appointment with PCP, states they are booked right now.    What is preventing the patient from scheduling follow up appointments within 7 days of discharge?  Earlier appointment not available   Nursing Interventions  Advised patient to make appointment   Has the patient kept scheduled appointments due by today?  N/A   Has home health visited the patient within 72 hours of discharge?  N/A   Psychosocial issues?  No   Did the patient receive a copy of their discharge instructions?  Yes   What is the patient's perception of their health status since discharge?  Improving   Is the patient/caregiver able to teach back signs and symptoms related to disease process for when to call PCP?  Yes   Is the patient/caregiver able to teach back signs and symptoms related to disease process for when to call 911?  Yes   Is the patient/caregiver able to teach back the hierarchy of who to call/visit for symptoms/problems? PCP, Specialist, Home health nurse, Urgent  [facility], ED, 911  Yes   If the patient is a current smoker, are they able to teach back resources for cessation?  Not a smoker   Additional teach back comments  Colitis is better. Is having swelling in both feet, is elevating feet. Advised to go to UC if swelling is not relieved with elevation of extremities. pt states she will do this.    Week 2 Call Completed?  Yes   Wrap up additional comments  States she is having swelling in her feet, is elevating them, no change in color or temperature. Has not been able to schedule FU with new PCP.           Leila Rebolledo RN

## 2021-06-15 ENCOUNTER — READMISSION MANAGEMENT (OUTPATIENT)
Dept: CALL CENTER | Facility: HOSPITAL | Age: 61
End: 2021-06-15

## 2021-06-15 NOTE — OUTREACH NOTE
Medical Week 3 Survey      Responses   [facility] patient discharged from?  [facility]   Does the patient have one of the following disease processes/diagnoses(primary or secondary)?  Other   Week 3 attempt successful?  No   Unsuccessful attempts  Attempt 1          Akilah Vuong RN

## 2021-06-16 ENCOUNTER — READMISSION MANAGEMENT (OUTPATIENT)
Dept: CALL CENTER | Facility: HOSPITAL | Age: 61
End: 2021-06-16

## 2021-08-10 ENCOUNTER — TRANSCRIBE ORDERS (OUTPATIENT)
Dept: LAB | Facility: HOSPITAL | Age: 61
End: 2021-08-10

## 2021-08-10 DIAGNOSIS — F11.20 OPIOID TYPE DEPENDENCE, CONTINUOUS (HCC): Primary | ICD-10-CM

## 2021-12-16 ENCOUNTER — TRANSCRIBE ORDERS (OUTPATIENT)
Dept: LAB | Facility: HOSPITAL | Age: 61
End: 2021-12-16

## 2021-12-16 ENCOUNTER — LAB (OUTPATIENT)
Dept: LAB | Facility: HOSPITAL | Age: 61
End: 2021-12-16

## 2021-12-16 ENCOUNTER — HOSPITAL ENCOUNTER (OUTPATIENT)
Dept: GENERAL RADIOLOGY | Facility: HOSPITAL | Age: 61
Discharge: HOME | End: 2021-12-16

## 2021-12-16 DIAGNOSIS — M25.559 HIP PAIN: ICD-10-CM

## 2021-12-16 DIAGNOSIS — M54.50 LOW BACK PAIN, UNSPECIFIED BACK PAIN LATERALITY, UNSPECIFIED CHRONICITY, UNSPECIFIED WHETHER SCIATICA PRESENT: ICD-10-CM

## 2021-12-16 DIAGNOSIS — J44.9 CHRONIC OBSTRUCTIVE PULMONARY DISEASE, UNSPECIFIED COPD TYPE (HCC): ICD-10-CM

## 2021-12-16 DIAGNOSIS — I10 HTN (HYPERTENSION) WITH GOAL TO BE DETERMINED: ICD-10-CM

## 2021-12-16 DIAGNOSIS — K21.9 GERD WITHOUT ESOPHAGITIS: ICD-10-CM

## 2021-12-16 DIAGNOSIS — D64.9 ANEMIA, UNSPECIFIED TYPE: ICD-10-CM

## 2021-12-16 DIAGNOSIS — I10 HTN (HYPERTENSION) WITH GOAL TO BE DETERMINED: Primary | ICD-10-CM

## 2021-12-16 LAB
ALBUMIN SERPL-MCNC: 4.3 G/DL (ref 3.5–5.2)
ALBUMIN/GLOB SERPL: 1.3 G/DL
ALP SERPL-CCNC: 102 U/L (ref 39–117)
ALT SERPL W P-5'-P-CCNC: 7 U/L (ref 1–33)
ANION GAP SERPL CALCULATED.3IONS-SCNC: 11.7 MMOL/L (ref 5–15)
AST SERPL-CCNC: 11 U/L (ref 1–32)
BASOPHILS # BLD AUTO: 0.03 10*3/MM3 (ref 0–0.2)
BASOPHILS NFR BLD AUTO: 0.5 % (ref 0–1.5)
BILIRUB SERPL-MCNC: 0.4 MG/DL (ref 0–1.2)
BUN SERPL-MCNC: 18 MG/DL (ref 8–23)
BUN/CREAT SERPL: 15.3 (ref 7–25)
CALCIUM SPEC-SCNC: 9.5 MG/DL (ref 8.6–10.5)
CHLORIDE SERPL-SCNC: 100 MMOL/L (ref 98–107)
CHOLEST SERPL-MCNC: 197 MG/DL (ref 0–200)
CO2 SERPL-SCNC: 26.3 MMOL/L (ref 22–29)
CREAT SERPL-MCNC: 1.18 MG/DL (ref 0.57–1)
DEPRECATED RDW RBC AUTO: 41.6 FL (ref 37–54)
EOSINOPHIL # BLD AUTO: 0.16 10*3/MM3 (ref 0–0.4)
EOSINOPHIL NFR BLD AUTO: 2.4 % (ref 0.3–6.2)
ERYTHROCYTE [DISTWIDTH] IN BLOOD BY AUTOMATED COUNT: 12.8 % (ref 12.3–15.4)
FERRITIN SERPL-MCNC: 52 NG/ML (ref 13–150)
GFR SERPL CREATININE-BSD FRML MDRD: 46 ML/MIN/1.73
GLOBULIN UR ELPH-MCNC: 3.3 GM/DL
GLUCOSE SERPL-MCNC: 84 MG/DL (ref 65–99)
HCT VFR BLD AUTO: 35.9 % (ref 34–46.6)
HDLC SERPL-MCNC: 48 MG/DL (ref 40–60)
HGB BLD-MCNC: 11.8 G/DL (ref 12–15.9)
IMM GRANULOCYTES # BLD AUTO: 0.02 10*3/MM3 (ref 0–0.05)
IMM GRANULOCYTES NFR BLD AUTO: 0.3 % (ref 0–0.5)
IRON 24H UR-MRATE: 71 MCG/DL (ref 37–145)
IRON SATN MFR SERPL: 21 % (ref 20–50)
LDLC SERPL CALC-MCNC: 118 MG/DL (ref 0–100)
LDLC/HDLC SERPL: 2.36 {RATIO}
LYMPHOCYTES # BLD AUTO: 0.79 10*3/MM3 (ref 0.7–3.1)
LYMPHOCYTES NFR BLD AUTO: 11.9 % (ref 19.6–45.3)
MCH RBC QN AUTO: 29.3 PG (ref 26.6–33)
MCHC RBC AUTO-ENTMCNC: 32.9 G/DL (ref 31.5–35.7)
MCV RBC AUTO: 89.1 FL (ref 79–97)
MONOCYTES # BLD AUTO: 0.36 10*3/MM3 (ref 0.1–0.9)
MONOCYTES NFR BLD AUTO: 5.4 % (ref 5–12)
NEUTROPHILS NFR BLD AUTO: 5.28 10*3/MM3 (ref 1.7–7)
NEUTROPHILS NFR BLD AUTO: 79.5 % (ref 42.7–76)
NRBC BLD AUTO-RTO: 0 /100 WBC (ref 0–0.2)
PLATELET # BLD AUTO: 194 10*3/MM3 (ref 140–450)
PMV BLD AUTO: 10.6 FL (ref 6–12)
POTASSIUM SERPL-SCNC: 4 MMOL/L (ref 3.5–5.2)
PROT SERPL-MCNC: 7.6 G/DL (ref 6–8.5)
RBC # BLD AUTO: 4.03 10*6/MM3 (ref 3.77–5.28)
SODIUM SERPL-SCNC: 138 MMOL/L (ref 136–145)
TIBC SERPL-MCNC: 337 MCG/DL (ref 298–536)
TRANSFERRIN SERPL-MCNC: 226 MG/DL (ref 200–360)
TRIGL SERPL-MCNC: 178 MG/DL (ref 0–150)
VLDLC SERPL-MCNC: 31 MG/DL (ref 5–40)
WBC NRBC COR # BLD: 6.64 10*3/MM3 (ref 3.4–10.8)

## 2021-12-16 PROCEDURE — 82728 ASSAY OF FERRITIN: CPT

## 2021-12-16 PROCEDURE — 83540 ASSAY OF IRON: CPT

## 2021-12-16 PROCEDURE — 84466 ASSAY OF TRANSFERRIN: CPT

## 2021-12-16 PROCEDURE — 85025 COMPLETE CBC W/AUTO DIFF WBC: CPT

## 2021-12-16 PROCEDURE — 36415 COLL VENOUS BLD VENIPUNCTURE: CPT

## 2021-12-16 PROCEDURE — 80061 LIPID PANEL: CPT

## 2021-12-16 PROCEDURE — 73522 X-RAY EXAM HIPS BI 3-4 VIEWS: CPT

## 2021-12-16 PROCEDURE — 80053 COMPREHEN METABOLIC PANEL: CPT

## 2022-08-24 ENCOUNTER — HOSPITAL ENCOUNTER (EMERGENCY)
Facility: HOSPITAL | Age: 62
Discharge: HOME | End: 2022-08-24
Attending: EMERGENCY MEDICINE | Admitting: EMERGENCY MEDICINE

## 2022-08-24 VITALS
HEART RATE: 66 BPM | TEMPERATURE: 98.3 F | OXYGEN SATURATION: 98 % | BODY MASS INDEX: 36.16 KG/M2 | DIASTOLIC BLOOD PRESSURE: 98 MMHG | SYSTOLIC BLOOD PRESSURE: 117 MMHG | RESPIRATION RATE: 18 BRPM | WEIGHT: 225 LBS | HEIGHT: 66 IN

## 2022-08-24 DIAGNOSIS — L97.511 ULCER OF RIGHT FOOT, LIMITED TO BREAKDOWN OF SKIN: Primary | ICD-10-CM

## 2022-08-24 PROCEDURE — 87186 SC STD MICRODIL/AGAR DIL: CPT | Performed by: NURSE PRACTITIONER

## 2022-08-24 PROCEDURE — 87147 CULTURE TYPE IMMUNOLOGIC: CPT | Performed by: NURSE PRACTITIONER

## 2022-08-24 PROCEDURE — 87205 SMEAR GRAM STAIN: CPT | Performed by: NURSE PRACTITIONER

## 2022-08-24 PROCEDURE — 87070 CULTURE OTHR SPECIMN AEROBIC: CPT | Performed by: NURSE PRACTITIONER

## 2022-08-24 PROCEDURE — 99283 EMERGENCY DEPT VISIT LOW MDM: CPT

## 2022-08-24 RX ORDER — CLINDAMYCIN HYDROCHLORIDE 300 MG/1
300 CAPSULE ORAL 3 TIMES DAILY
Qty: 30 CAPSULE | Refills: 0 | Status: SHIPPED | OUTPATIENT
Start: 2022-08-24 | End: 2022-09-05 | Stop reason: HOSPADM

## 2022-08-24 NOTE — CASE MANAGEMENT/SOCIAL WORK
Case Management/Social Work    Patient Name:  Cait Lu  YOB: 1960  MRN: [id number]  Admit Date:  8/24/2022      Wound Care has been set up through [facility].        Electronically signed by:  TEODORA MOTA  08/24/22 15:28 EDT

## 2022-08-24 NOTE — ED PROVIDER NOTES
Subjective   62-year-old female presents to the ED today for complaint of ulcer on the bottom of her right foot.  She states that it originally started as a blister and at this point the blister has ruptured and is painful at this time.  It is no redness or swelling around the blister.  She states that she does not know where the original blister came from.  Patient denies fevers or chills.  No nausea, vomiting or diarrhea.  No other systemic symptoms at this time.  Patient does have history of hypertension, hypercholesterolemia and impaired mobility due to blindness.  She denies any other associated signs or symptoms at this time          Review of Systems   Constitutional: Negative.    HENT: Negative.    Eyes: Negative.    Respiratory: Negative.    Cardiovascular: Negative.    Gastrointestinal: Negative.    Endocrine: Negative.    Genitourinary: Negative.    Musculoskeletal: Negative.    Skin: Positive for wound.   Allergic/Immunologic: Negative.    Neurological: Negative.    Hematological: Negative.    Psychiatric/Behavioral: Negative.        Past Medical History:   Diagnosis Date   • Abdominal pain    • Acute bronchitis with bronchospasm    • Allergic rhinitis due to pollen    • Cellulitis    • Cutaneous candidiasis    • Edema    • Former smoker    • Hypercholesterolemia    • Hypertension    • Impaired functional mobility, balance, gait, and endurance    • Joint pain of ankle and foot    • Legally blind    • Low back pain    • Neuralgia    • Sebaceous cyst    • Toe joint pain    • Urinary tract infection, acute    • Vitamin D deficiency        No Known Allergies    History reviewed. No pertinent surgical history.    Family History   Problem Relation Age of Onset   • Cancer Mother         breast   • Diabetes Mother    • Early death Mother         age 73   • Heart disease Father    • Heart disease Other        Social History     Socioeconomic History   • Marital status:    Tobacco Use   • Smoking status:  Former Smoker     Packs/day: 1.00     Years: 42.00     Pack years: 42.00     Start date: 1974     Quit date: 2016     Years since quittin.6   • Smokeless tobacco: Never Used   Vaping Use   • Vaping Use: Every day   Substance and Sexual Activity   • Alcohol use: No   • Drug use: No   • Sexual activity: Defer           Objective   Physical Exam  Vitals and nursing note reviewed.   HENT:      Head: Normocephalic and atraumatic.      Nose: Nose normal.      Mouth/Throat:      Mouth: Mucous membranes are moist.   Eyes:      Extraocular Movements: Extraocular movements intact.      Pupils: Pupils are equal, round, and reactive to light.   Cardiovascular:      Rate and Rhythm: Normal rate and regular rhythm.      Pulses: Normal pulses.      Heart sounds: Normal heart sounds.   Pulmonary:      Effort: Pulmonary effort is normal.   Abdominal:      General: Bowel sounds are normal.   Skin:     Capillary Refill: Capillary refill takes 2 to 3 seconds.      Coloration: Skin is pale.      Findings: Lesion present.      Comments: 1 x2mm wound on the bottom of her right heel, no drainage.  No redness.  The center is pink with adipose tissue but not bleeding   Neurological:      Mental Status: She is alert and oriented to person, place, and time.   Psychiatric:         Mood and Affect: Mood normal.         Procedures           ED Course                                           MDM  Number of Diagnoses or Management Options  Risk of Complications, Morbidity, and/or Mortality  General comments: Called case management to discuss arranging home health for patient.  She will call me back due to insurance she is trying to find somebody who will take patient        Final diagnoses:   Ulcer of right foot, limited to breakdown of skin (HCC)       ED Disposition  ED Disposition     ED Disposition   Discharge    Condition   Stable    Comment   --             Geovanna Oconnor, APRN  [address] [facility]  [address]  [phone]      As needed         Medication List      New Prescriptions    clindamycin 300 MG capsule  Commonly known as: CLEOCIN  Take 1 capsule by mouth 3 (Three) Times a Day.           Where to Get Your Medications      These medications were sent to [place], [place] - [address] [place] [address] - [phone]  - [phone] [address] [address]    Phone: [phone]   · clindamycin 300 MG capsule          Debbie Liz, APRN  08/24/22 1559

## 2022-08-25 ENCOUNTER — HOME HEALTH ADMISSION (OUTPATIENT)
Dept: HOME HEALTH SERVICES | Facility: HOME HEALTHCARE | Age: 62
End: 2022-08-25

## 2022-08-25 ENCOUNTER — HOME CARE VISIT (OUTPATIENT)
Dept: HOME HEALTH SERVICES | Facility: HOME HEALTHCARE | Age: 62
End: 2022-08-25

## 2022-08-25 ENCOUNTER — TRANSCRIBE ORDERS (OUTPATIENT)
Dept: HOME HEALTH SERVICES | Facility: HOME HEALTHCARE | Age: 62
End: 2022-08-25

## 2022-08-25 DIAGNOSIS — L97.511 ULCER OF RIGHT FOOT LIMITED TO BREAKDOWN OF SKIN: Primary | ICD-10-CM

## 2022-08-26 ENCOUNTER — HOME CARE VISIT (OUTPATIENT)
Dept: HOME HEALTH SERVICES | Facility: HOME HEALTHCARE | Age: 62
End: 2022-08-26

## 2022-08-26 PROCEDURE — G0299 HHS/HOSPICE OF RN EA 15 MIN: HCPCS

## 2022-08-27 LAB
BACTERIA SPEC AEROBE CULT: ABNORMAL
GRAM STN SPEC: ABNORMAL
GRAM STN SPEC: ABNORMAL

## 2022-08-28 VITALS
HEART RATE: 91 BPM | SYSTOLIC BLOOD PRESSURE: 129 MMHG | DIASTOLIC BLOOD PRESSURE: 88 MMHG | OXYGEN SATURATION: 98 % | RESPIRATION RATE: 16 BRPM | TEMPERATURE: 98.8 F

## 2022-08-28 NOTE — HOME HEALTH
SOC Note:    Home Health ordered for: disciplines SN    Reason for Hosp/Primary Dx/Co-morbidities: Non-healing open blister to right foot.    Focus of Care: Wound care.    Current Functional status/mobility/DME: Impaired gait due to wound/blister right foot. No assistive device for ambulation.     HB status/Living Arrangements: Lives with cousin.    Skin Integrity/wound status: non-healing open blister to right foot.    Code Status: Full Code    Fall Risk: Yes    POC confirmed with Cait Kyle (representative for self) on date 8/26/22.

## 2022-08-29 ENCOUNTER — HOME CARE VISIT (OUTPATIENT)
Dept: HOME HEALTH SERVICES | Facility: HOME HEALTHCARE | Age: 62
End: 2022-08-29

## 2022-09-02 ENCOUNTER — APPOINTMENT (OUTPATIENT)
Dept: GENERAL RADIOLOGY | Facility: HOSPITAL | Age: 62
End: 2022-09-02

## 2022-09-02 ENCOUNTER — HOSPITAL ENCOUNTER (OUTPATIENT)
Facility: HOSPITAL | Age: 62
Setting detail: OBSERVATION
Discharge: HOME | End: 2022-09-05
Attending: EMERGENCY MEDICINE | Admitting: INTERNAL MEDICINE

## 2022-09-02 ENCOUNTER — APPOINTMENT (OUTPATIENT)
Dept: CT IMAGING | Facility: HOSPITAL | Age: 62
End: 2022-09-02

## 2022-09-02 ENCOUNTER — HOME CARE VISIT (OUTPATIENT)
Dept: HOME HEALTH SERVICES | Facility: HOME HEALTHCARE | Age: 62
End: 2022-09-02

## 2022-09-02 DIAGNOSIS — J90 PLEURAL EFFUSION: ICD-10-CM

## 2022-09-02 DIAGNOSIS — N17.9 ACUTE RENAL FAILURE, UNSPECIFIED ACUTE RENAL FAILURE TYPE: ICD-10-CM

## 2022-09-02 DIAGNOSIS — N39.0 URINARY TRACT INFECTION WITHOUT HEMATURIA, SITE UNSPECIFIED: ICD-10-CM
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
## 2022-09-02 DIAGNOSIS — R77.8 ELEVATED TROPONIN: ICD-10-CM

## 2022-09-02 DIAGNOSIS — T07.XXXA MULTIPLE CONTUSIONS: ICD-10-CM

## 2022-09-02 PROBLEM — T79.6XXA TRAUMATIC RHABDOMYOLYSIS (HCC): Status: ACTIVE | Noted: 2022-09-02

## 2022-09-02 LAB
ALBUMIN SERPL-MCNC: 3.8 G/DL (ref 3.5–5.2)
ALBUMIN/GLOB SERPL: 1.2 G/DL
ALP SERPL-CCNC: 90 U/L (ref 39–117)
ALT SERPL W P-5'-P-CCNC: 13 U/L (ref 1–33)
AMPHET+METHAMPHET UR QL: POSITIVE
AMPHETAMINES UR QL: POSITIVE
ANION GAP SERPL CALCULATED.3IONS-SCNC: 10.7 MMOL/L (ref 5–15)
AST SERPL-CCNC: 26 U/L (ref 1–32)
BACTERIA UR QL AUTO: ABNORMAL /HPF
BARBITURATES UR QL SCN: NEGATIVE
BASOPHILS # BLD AUTO: 0.03 10*3/MM3 (ref 0–0.2)
BASOPHILS NFR BLD AUTO: 0.5 % (ref 0–1.5)
BENZODIAZ UR QL SCN: POSITIVE
BILIRUB SERPL-MCNC: 0.3 MG/DL (ref 0–1.2)
BILIRUB UR QL STRIP: NEGATIVE
BUN SERPL-MCNC: 40 MG/DL (ref 8–23)
BUN/CREAT SERPL: 12.8 (ref 7–25)
BUPRENORPHINE SERPL-MCNC: POSITIVE NG/ML
CALCIUM SPEC-SCNC: 9.3 MG/DL (ref 8.6–10.5)
CANNABINOIDS SERPL QL: NEGATIVE
CHLORIDE SERPL-SCNC: 107 MMOL/L (ref 98–107)
CK MB SERPL-CCNC: 17.56 NG/ML
CK SERPL-CCNC: 835 U/L (ref 20–180)
CLARITY UR: CLEAR
CO2 SERPL-SCNC: 23.3 MMOL/L (ref 22–29)
COCAINE UR QL: NEGATIVE
COLOR UR: YELLOW
CREAT SERPL-MCNC: 3.12 MG/DL (ref 0.57–1)
D-LACTATE SERPL-SCNC: 0.7 MMOL/L (ref 0.5–2)
DEPRECATED RDW RBC AUTO: 44.5 FL (ref 37–54)
EGFRCR SERPLBLD CKD-EPI 2021: 16.2 ML/MIN/1.73
EOSINOPHIL # BLD AUTO: 0.12 10*3/MM3 (ref 0–0.4)
EOSINOPHIL NFR BLD AUTO: 2.1 % (ref 0.3–6.2)
ERYTHROCYTE [DISTWIDTH] IN BLOOD BY AUTOMATED COUNT: 12.8 % (ref 12.3–15.4)
ETHANOL BLD-MCNC: <10 MG/DL (ref 0–10)
ETHANOL UR QL: <0.01 %
GLOBULIN UR ELPH-MCNC: 3.2 GM/DL
GLUCOSE SERPL-MCNC: 85 MG/DL (ref 65–99)
GLUCOSE UR STRIP-MCNC: ABNORMAL MG/DL
HCT VFR BLD AUTO: 30.4 % (ref 34–46.6)
HGB BLD-MCNC: 9.5 G/DL (ref 12–15.9)
HGB UR QL STRIP.AUTO: ABNORMAL
HYALINE CASTS UR QL AUTO: ABNORMAL /LPF
IMM GRANULOCYTES # BLD AUTO: 0.02 10*3/MM3 (ref 0–0.05)
IMM GRANULOCYTES NFR BLD AUTO: 0.3 % (ref 0–0.5)
KETONES UR QL STRIP: NEGATIVE
LEUKOCYTE ESTERASE UR QL STRIP.AUTO: ABNORMAL
LYMPHOCYTES # BLD AUTO: 0.75 10*3/MM3 (ref 0.7–3.1)
LYMPHOCYTES NFR BLD AUTO: 12.8 % (ref 19.6–45.3)
MCH RBC QN AUTO: 30 PG (ref 26.6–33)
MCHC RBC AUTO-ENTMCNC: 31.3 G/DL (ref 31.5–35.7)
MCV RBC AUTO: 95.9 FL (ref 79–97)
METHADONE UR QL SCN: NEGATIVE
MONOCYTES # BLD AUTO: 0.29 10*3/MM3 (ref 0.1–0.9)
MONOCYTES NFR BLD AUTO: 5 % (ref 5–12)
NEUTROPHILS NFR BLD AUTO: 4.64 10*3/MM3 (ref 1.7–7)
NEUTROPHILS NFR BLD AUTO: 79.3 % (ref 42.7–76)
NITRITE UR QL STRIP: NEGATIVE
NRBC BLD AUTO-RTO: 0 /100 WBC (ref 0–0.2)
OPIATES UR QL: NEGATIVE
OXYCODONE UR QL SCN: NEGATIVE
PCP UR QL SCN: NEGATIVE
PH UR STRIP.AUTO: <=5 [PH] (ref 5–8)
PLATELET # BLD AUTO: 178 10*3/MM3 (ref 140–450)
PMV BLD AUTO: 9.7 FL (ref 6–12)
POTASSIUM SERPL-SCNC: 5.6 MMOL/L (ref 3.5–5.2)
PROCALCITONIN SERPL-MCNC: 0.14 NG/ML (ref 0–0.25)
PROPOXYPH UR QL: NEGATIVE
PROT SERPL-MCNC: 7 G/DL (ref 6–8.5)
PROT UR QL STRIP: ABNORMAL
RBC # BLD AUTO: 3.17 10*6/MM3 (ref 3.77–5.28)
RBC # UR STRIP: ABNORMAL /HPF
REF LAB TEST METHOD: ABNORMAL
SODIUM SERPL-SCNC: 141 MMOL/L (ref 136–145)
SP GR UR STRIP: 1.01 (ref 1–1.03)
SQUAMOUS #/AREA URNS HPF: ABNORMAL /HPF
TRICYCLICS UR QL SCN: POSITIVE
TROPONIN T SERPL-MCNC: 0.08 NG/ML (ref 0–0.03)
UROBILINOGEN UR QL STRIP: ABNORMAL
WBC # UR STRIP: ABNORMAL /HPF
WBC NRBC COR # BLD: 5.85 10*3/MM3 (ref 3.4–10.8)

## 2022-09-02 PROCEDURE — 80306 DRUG TEST PRSMV INSTRMNT: CPT | Performed by: PHYSICIAN ASSISTANT

## 2022-09-02 PROCEDURE — G0378 HOSPITAL OBSERVATION PER HR: HCPCS

## 2022-09-02 PROCEDURE — 83605 ASSAY OF LACTIC ACID: CPT | Performed by: PHYSICIAN ASSISTANT

## 2022-09-02 PROCEDURE — 71045 X-RAY EXAM CHEST 1 VIEW: CPT

## 2022-09-02 PROCEDURE — 82550 ASSAY OF CK (CPK): CPT | Performed by: PHYSICIAN ASSISTANT

## 2022-09-02 PROCEDURE — 82077 ASSAY SPEC XCP UR&BREATH IA: CPT | Performed by: PHYSICIAN ASSISTANT

## 2022-09-02 PROCEDURE — 96361 HYDRATE IV INFUSION ADD-ON: CPT

## 2022-09-02 PROCEDURE — 72125 CT NECK SPINE W/O DYE: CPT

## 2022-09-02 PROCEDURE — 99220 PR INITIAL OBSERVATION CARE/DAY 70 MINUTES: CPT | Performed by: INTERNAL MEDICINE

## 2022-09-02 PROCEDURE — 82553 CREATINE MB FRACTION: CPT | Performed by: PHYSICIAN ASSISTANT

## 2022-09-02 PROCEDURE — 93005 ELECTROCARDIOGRAM TRACING: CPT | Performed by: EMERGENCY MEDICINE

## 2022-09-02 PROCEDURE — 99284 EMERGENCY DEPT VISIT MOD MDM: CPT

## 2022-09-02 PROCEDURE — 84484 ASSAY OF TROPONIN QUANT: CPT | Performed by: PHYSICIAN ASSISTANT

## 2022-09-02 PROCEDURE — 25010000002 CEFTRIAXONE SODIUM-DEXTROSE 1-3.74 GM-%(50ML) RECONSTITUTED SOLUTION: Performed by: EMERGENCY MEDICINE

## 2022-09-02 PROCEDURE — 84145 PROCALCITONIN (PCT): CPT | Performed by: PHYSICIAN ASSISTANT

## 2022-09-02 PROCEDURE — 96365 THER/PROPH/DIAG IV INF INIT: CPT

## 2022-09-02 PROCEDURE — 85025 COMPLETE CBC W/AUTO DIFF WBC: CPT | Performed by: EMERGENCY MEDICINE

## 2022-09-02 PROCEDURE — 80053 COMPREHEN METABOLIC PANEL: CPT | Performed by: EMERGENCY MEDICINE

## 2022-09-02 PROCEDURE — 36415 COLL VENOUS BLD VENIPUNCTURE: CPT

## 2022-09-02 PROCEDURE — 81001 URINALYSIS AUTO W/SCOPE: CPT | Performed by: PHYSICIAN ASSISTANT

## 2022-09-02 PROCEDURE — 70486 CT MAXILLOFACIAL W/O DYE: CPT

## 2022-09-02 PROCEDURE — 70450 CT HEAD/BRAIN W/O DYE: CPT

## 2022-09-02 RX ORDER — ACETAMINOPHEN 160 MG/5ML
650 SOLUTION ORAL EVERY 4 HOURS PRN
Status: DISCONTINUED | OUTPATIENT
Start: 2022-09-02 | End: 2022-09-05 | Stop reason: HOSPADM

## 2022-09-02 RX ORDER — AMOXICILLIN 250 MG
2 CAPSULE ORAL 2 TIMES DAILY
Status: DISCONTINUED | OUTPATIENT
Start: 2022-09-03 | End: 2022-09-05 | Stop reason: HOSPADM

## 2022-09-02 RX ORDER — SODIUM CHLORIDE 0.9 % (FLUSH) 0.9 %
3 SYRINGE (ML) INJECTION EVERY 12 HOURS SCHEDULED
Status: DISCONTINUED | OUTPATIENT
Start: 2022-09-03 | End: 2022-09-05 | Stop reason: HOSPADM

## 2022-09-02 RX ORDER — FLUTICASONE PROPIONATE 50 MCG
2 SPRAY, SUSPENSION (ML) NASAL DAILY
Status: DISCONTINUED | OUTPATIENT
Start: 2022-09-03 | End: 2022-09-05 | Stop reason: HOSPADM

## 2022-09-02 RX ORDER — SODIUM CHLORIDE 9 MG/ML
150 INJECTION, SOLUTION INTRAVENOUS CONTINUOUS
Status: DISCONTINUED | OUTPATIENT
Start: 2022-09-03 | End: 2022-09-04

## 2022-09-02 RX ORDER — BUPRENORPHINE AND NALOXONE 8; 2 MG/1; MG/1
2 FILM, SOLUBLE BUCCAL; SUBLINGUAL DAILY
Status: DISCONTINUED | OUTPATIENT
Start: 2022-09-03 | End: 2022-09-05 | Stop reason: HOSPADM

## 2022-09-02 RX ORDER — PANTOPRAZOLE SODIUM 40 MG/1
40 TABLET, DELAYED RELEASE ORAL
Status: DISCONTINUED | OUTPATIENT
Start: 2022-09-03 | End: 2022-09-05 | Stop reason: HOSPADM

## 2022-09-02 RX ORDER — ONDANSETRON 2 MG/ML
4 INJECTION INTRAMUSCULAR; INTRAVENOUS EVERY 6 HOURS PRN
Status: DISCONTINUED | OUTPATIENT
Start: 2022-09-02 | End: 2022-09-05 | Stop reason: HOSPADM

## 2022-09-02 RX ORDER — BUPROPION HYDROCHLORIDE 150 MG/1
150 TABLET, EXTENDED RELEASE ORAL DAILY
Status: DISCONTINUED | OUTPATIENT
Start: 2022-09-03 | End: 2022-09-05 | Stop reason: HOSPADM

## 2022-09-02 RX ORDER — SODIUM CHLORIDE 0.9 % (FLUSH) 0.9 %
3-10 SYRINGE (ML) INJECTION AS NEEDED
Status: DISCONTINUED | OUTPATIENT
Start: 2022-09-02 | End: 2022-09-05 | Stop reason: HOSPADM

## 2022-09-02 RX ORDER — ACETAMINOPHEN 325 MG/1
650 TABLET ORAL EVERY 4 HOURS PRN
Status: DISCONTINUED | OUTPATIENT
Start: 2022-09-02 | End: 2022-09-05 | Stop reason: HOSPADM

## 2022-09-02 RX ORDER — QUETIAPINE FUMARATE 100 MG/1
300 TABLET, FILM COATED ORAL NIGHTLY
Status: DISCONTINUED | OUTPATIENT
Start: 2022-09-03 | End: 2022-09-05 | Stop reason: HOSPADM

## 2022-09-02 RX ORDER — BISACODYL 5 MG/1
5 TABLET, DELAYED RELEASE ORAL DAILY PRN
Status: DISCONTINUED | OUTPATIENT
Start: 2022-09-02 | End: 2022-09-05 | Stop reason: HOSPADM

## 2022-09-02 RX ORDER — CEFTRIAXONE 1 G/50ML
1 INJECTION, SOLUTION INTRAVENOUS ONCE
Status: COMPLETED | OUTPATIENT
Start: 2022-09-02 | End: 2022-09-02

## 2022-09-02 RX ORDER — ENOXAPARIN SODIUM 100 MG/ML
30 INJECTION SUBCUTANEOUS EVERY 24 HOURS
Status: DISCONTINUED | OUTPATIENT
Start: 2022-09-03 | End: 2022-09-04

## 2022-09-02 RX ORDER — OXYCODONE AND ACETAMINOPHEN 7.5; 325 MG/1; MG/1
1 TABLET ORAL EVERY 4 HOURS PRN
Status: DISCONTINUED | OUTPATIENT
Start: 2022-09-02 | End: 2022-09-05 | Stop reason: HOSPADM

## 2022-09-02 RX ORDER — BISACODYL 10 MG
10 SUPPOSITORY, RECTAL RECTAL DAILY PRN
Status: DISCONTINUED | OUTPATIENT
Start: 2022-09-02 | End: 2022-09-05 | Stop reason: HOSPADM

## 2022-09-02 RX ORDER — POLYETHYLENE GLYCOL 3350 17 G/17G
17 POWDER, FOR SOLUTION ORAL DAILY PRN
Status: DISCONTINUED | OUTPATIENT
Start: 2022-09-02 | End: 2022-09-05 | Stop reason: HOSPADM

## 2022-09-02 RX ORDER — GABAPENTIN 300 MG/1
600 CAPSULE ORAL EVERY 8 HOURS SCHEDULED
Status: DISCONTINUED | OUTPATIENT
Start: 2022-09-03 | End: 2022-09-05 | Stop reason: HOSPADM

## 2022-09-02 RX ORDER — ACETAMINOPHEN 650 MG/1
650 SUPPOSITORY RECTAL EVERY 4 HOURS PRN
Status: DISCONTINUED | OUTPATIENT
Start: 2022-09-02 | End: 2022-09-05 | Stop reason: HOSPADM

## 2022-09-02 RX ADMIN — SODIUM CHLORIDE 1000 ML: 9 INJECTION, SOLUTION INTRAVENOUS at 19:25

## 2022-09-02 RX ADMIN — SODIUM CHLORIDE 1000 ML: 9 INJECTION, SOLUTION INTRAVENOUS at 18:10

## 2022-09-02 RX ADMIN — CEFTRIAXONE 1 G: 1 INJECTION, SOLUTION INTRAVENOUS at 21:43
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
## 2022-09-03 ENCOUNTER — APPOINTMENT (OUTPATIENT)
Dept: ULTRASOUND IMAGING | Facility: HOSPITAL | Age: 62
End: 2022-09-03

## 2022-09-03 LAB
ANION GAP SERPL CALCULATED.3IONS-SCNC: 6.9 MMOL/L (ref 5–15)
BASOPHILS # BLD AUTO: 0.05 10*3/MM3 (ref 0–0.2)
BASOPHILS NFR BLD AUTO: 1.3 % (ref 0–1.5)
BUN SERPL-MCNC: 29 MG/DL (ref 8–23)
BUN/CREAT SERPL: 13.6 (ref 7–25)
CALCIUM SPEC-SCNC: 8.7 MG/DL (ref 8.6–10.5)
CHLORIDE SERPL-SCNC: 112 MMOL/L (ref 98–107)
CK SERPL-CCNC: 506 U/L (ref 20–180)
CLUMPED PLATELETS: PRESENT
CO2 SERPL-SCNC: 26.1 MMOL/L (ref 22–29)
CREAT SERPL-MCNC: 2.13 MG/DL (ref 0.57–1)
DEPRECATED RDW RBC AUTO: 44.3 FL (ref 37–54)
EGFRCR SERPLBLD CKD-EPI 2021: 25.6 ML/MIN/1.73
EOSINOPHIL # BLD AUTO: 0.16 10*3/MM3 (ref 0–0.4)
EOSINOPHIL NFR BLD AUTO: 4.2 % (ref 0.3–6.2)
ERYTHROCYTE [DISTWIDTH] IN BLOOD BY AUTOMATED COUNT: 12.8 % (ref 12.3–15.4)
GLUCOSE BLDC GLUCOMTR-MCNC: 91 MG/DL (ref 70–130)
GLUCOSE SERPL-MCNC: 104 MG/DL (ref 65–99)
HCT VFR BLD AUTO: 35.1 % (ref 34–46.6)
HGB BLD-MCNC: 11.1 G/DL (ref 12–15.9)
IMM GRANULOCYTES # BLD AUTO: 0.01 10*3/MM3 (ref 0–0.05)
IMM GRANULOCYTES NFR BLD AUTO: 0.3 % (ref 0–0.5)
LYMPHOCYTES # BLD AUTO: 1.09 10*3/MM3 (ref 0.7–3.1)
LYMPHOCYTES NFR BLD AUTO: 28.6 % (ref 19.6–45.3)
MCH RBC QN AUTO: 29.8 PG (ref 26.6–33)
MCHC RBC AUTO-ENTMCNC: 31.6 G/DL (ref 31.5–35.7)
MCV RBC AUTO: 94.4 FL (ref 79–97)
MONOCYTES # BLD AUTO: 0.33 10*3/MM3 (ref 0.1–0.9)
MONOCYTES NFR BLD AUTO: 8.7 % (ref 5–12)
NEUTROPHILS NFR BLD AUTO: 2.17 10*3/MM3 (ref 1.7–7)
NEUTROPHILS NFR BLD AUTO: 56.9 % (ref 42.7–76)
NRBC BLD AUTO-RTO: 0 /100 WBC (ref 0–0.2)
PLATELET # BLD AUTO: 200 10*3/MM3 (ref 140–450)
PMV BLD AUTO: 10.4 FL (ref 6–12)
POTASSIUM SERPL-SCNC: 4.3 MMOL/L (ref 3.5–5.2)
RBC # BLD AUTO: 3.72 10*6/MM3 (ref 3.77–5.28)
RBC MORPH BLD: NORMAL
SODIUM SERPL-SCNC: 145 MMOL/L (ref 136–145)
TROPONIN T SERPL-MCNC: 0.04 NG/ML (ref 0–0.03)
WBC MORPH BLD: NORMAL
WBC NRBC COR # BLD: 3.81 10*3/MM3 (ref 3.4–10.8)

## 2022-09-03 PROCEDURE — 99225 PR SBSQ OBSERVATION CARE/DAY 25 MINUTES: CPT | Performed by: STUDENT IN AN ORGANIZED HEALTH CARE EDUCATION/TRAINING PROGRAM

## 2022-09-03 PROCEDURE — 85025 COMPLETE CBC W/AUTO DIFF WBC: CPT | Performed by: INTERNAL MEDICINE

## 2022-09-03 PROCEDURE — 94762 N-INVAS EAR/PLS OXIMTRY CONT: CPT

## 2022-09-03 PROCEDURE — 94664 DEMO&/EVAL PT USE INHALER: CPT

## 2022-09-03 PROCEDURE — 82550 ASSAY OF CK (CPK): CPT | Performed by: INTERNAL MEDICINE

## 2022-09-03 PROCEDURE — 94761 N-INVAS EAR/PLS OXIMETRY MLT: CPT

## 2022-09-03 PROCEDURE — 94799 UNLISTED PULMONARY SVC/PX: CPT

## 2022-09-03 PROCEDURE — 96361 HYDRATE IV INFUSION ADD-ON: CPT

## 2022-09-03 PROCEDURE — 80048 BASIC METABOLIC PNL TOTAL CA: CPT | Performed by: INTERNAL MEDICINE

## 2022-09-03 PROCEDURE — G0378 HOSPITAL OBSERVATION PER HR: HCPCS

## 2022-09-03 PROCEDURE — 84484 ASSAY OF TROPONIN QUANT: CPT | Performed by: INTERNAL MEDICINE

## 2022-09-03 PROCEDURE — 76775 US EXAM ABDO BACK WALL LIM: CPT

## 2022-09-03 PROCEDURE — 96372 THER/PROPH/DIAG INJ SC/IM: CPT

## 2022-09-03 PROCEDURE — 94640 AIRWAY INHALATION TREATMENT: CPT

## 2022-09-03 PROCEDURE — 25010000002 ENOXAPARIN PER 10 MG: Performed by: INTERNAL MEDICINE

## 2022-09-03 PROCEDURE — 82962 GLUCOSE BLOOD TEST: CPT

## 2022-09-03 PROCEDURE — 94760 N-INVAS EAR/PLS OXIMETRY 1: CPT

## 2022-09-03 PROCEDURE — 85007 BL SMEAR W/DIFF WBC COUNT: CPT | Performed by: INTERNAL MEDICINE

## 2022-09-03 RX ORDER — ALPRAZOLAM 0.5 MG/1
2 TABLET ORAL 3 TIMES DAILY PRN
Status: DISCONTINUED | OUTPATIENT
Start: 2022-09-03 | End: 2022-09-03

## 2022-09-03 RX ORDER — IPRATROPIUM BROMIDE AND ALBUTEROL SULFATE 2.5; .5 MG/3ML; MG/3ML
3 SOLUTION RESPIRATORY (INHALATION) EVERY 4 HOURS PRN
Status: DISCONTINUED | OUTPATIENT
Start: 2022-09-03 | End: 2022-09-05 | Stop reason: HOSPADM

## 2022-09-03 RX ORDER — IPRATROPIUM BROMIDE AND ALBUTEROL SULFATE 2.5; .5 MG/3ML; MG/3ML
3 SOLUTION RESPIRATORY (INHALATION)
Status: DISCONTINUED | OUTPATIENT
Start: 2022-09-03 | End: 2022-09-05 | Stop reason: HOSPADM

## 2022-09-03 RX ORDER — BUDESONIDE 0.5 MG/2ML
0.5 INHALANT ORAL
Status: DISCONTINUED | OUTPATIENT
Start: 2022-09-03 | End: 2022-09-05 | Stop reason: HOSPADM

## 2022-09-03 RX ADMIN — QUETIAPINE FUMARATE 300 MG: 100 TABLET ORAL at 00:18

## 2022-09-03 RX ADMIN — SODIUM CHLORIDE 150 ML/HR: 9 INJECTION, SOLUTION INTRAVENOUS at 06:19

## 2022-09-03 RX ADMIN — ACETAMINOPHEN 650 MG: 325 TABLET ORAL at 08:52

## 2022-09-03 RX ADMIN — BUDESONIDE 0.5 MG: 0.5 SUSPENSION RESPIRATORY (INHALATION) at 19:23

## 2022-09-03 RX ADMIN — OXYCODONE HYDROCHLORIDE AND ACETAMINOPHEN 1 TABLET: 7.5; 325 TABLET ORAL at 05:31

## 2022-09-03 RX ADMIN — GABAPENTIN 600 MG: 300 CAPSULE ORAL at 21:09

## 2022-09-03 RX ADMIN — IPRATROPIUM BROMIDE AND ALBUTEROL SULFATE 3 ML: .5; 3 SOLUTION RESPIRATORY (INHALATION) at 06:31

## 2022-09-03 RX ADMIN — Medication 3 ML: at 21:11

## 2022-09-03 RX ADMIN — OXYCODONE HYDROCHLORIDE AND ACETAMINOPHEN 1 TABLET: 7.5; 325 TABLET ORAL at 00:18

## 2022-09-03 RX ADMIN — ENOXAPARIN SODIUM 30 MG: 40 INJECTION SUBCUTANEOUS at 05:31

## 2022-09-03 RX ADMIN — SENNOSIDES AND DOCUSATE SODIUM 2 TABLET: 50; 8.6 TABLET ORAL at 21:10

## 2022-09-03 RX ADMIN — IPRATROPIUM BROMIDE AND ALBUTEROL SULFATE 3 ML: .5; 3 SOLUTION RESPIRATORY (INHALATION) at 12:36

## 2022-09-03 RX ADMIN — SODIUM CHLORIDE 150 ML/HR: 9 INJECTION, SOLUTION INTRAVENOUS at 23:13

## 2022-09-03 RX ADMIN — GABAPENTIN 600 MG: 300 CAPSULE ORAL at 13:32

## 2022-09-03 RX ADMIN — GABAPENTIN 600 MG: 300 CAPSULE ORAL at 05:31

## 2022-09-03 RX ADMIN — SENNOSIDES AND DOCUSATE SODIUM 2 TABLET: 50; 8.6 TABLET ORAL at 08:47

## 2022-09-03 RX ADMIN — SODIUM CHLORIDE 150 ML/HR: 9 INJECTION, SOLUTION INTRAVENOUS at 16:31

## 2022-09-03 RX ADMIN — BUPRENORPHINE AND NALOXONE 2 FILM: 8; 2 FILM, SOLUBLE BUCCAL; SUBLINGUAL at 08:47

## 2022-09-03 RX ADMIN — PANTOPRAZOLE SODIUM 40 MG: 40 TABLET, DELAYED RELEASE ORAL at 06:19

## 2022-09-03 RX ADMIN — Medication 3 ML: at 00:18

## 2022-09-03 RX ADMIN — IPRATROPIUM BROMIDE AND ALBUTEROL SULFATE 3 ML: .5; 3 SOLUTION RESPIRATORY (INHALATION) at 19:22

## 2022-09-03 RX ADMIN — BUPROPION HYDROCHLORIDE 150 MG: 150 TABLET, EXTENDED RELEASE ORAL at 08:47

## 2022-09-03 RX ADMIN — SENNOSIDES AND DOCUSATE SODIUM 2 TABLET: 50; 8.6 TABLET ORAL at 00:18

## 2022-09-03 RX ADMIN — GABAPENTIN 600 MG: 300 CAPSULE ORAL at 00:18

## 2022-09-03 RX ADMIN — QUETIAPINE FUMARATE 300 MG: 100 TABLET ORAL at 21:10

## 2022-09-03 RX ADMIN — OXYCODONE HYDROCHLORIDE AND ACETAMINOPHEN 1 TABLET: 7.5; 325 TABLET ORAL at 21:14

## 2022-09-03 RX ADMIN — SODIUM CHLORIDE 150 ML/HR: 9 INJECTION, SOLUTION INTRAVENOUS at 00:17

## 2022-09-03 RX ADMIN — BUDESONIDE 0.5 MG: 0.5 SUSPENSION RESPIRATORY (INHALATION) at 06:31
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
## 2022-09-03 NOTE — CASE MANAGEMENT/SOCIAL WORK
Discharge Planning Assessment   Bone     Patient Name: Cait Lu  MRN: [id number]  Today's Date: 9/3/2022    Admit Date: 9/2/2022     Discharge Needs Assessment     Row Name 09/03/22 0945       Living Environment    People in Home alone    Current Living Arrangements home    Potentially Unsafe Housing Conditions unable to assess    Primary Care Provided by self    Provides Primary Care For no one    Family Caregiver if Needed sibling(s)    Family Caregiver Names Sylvia Raymundosimran    Quality of Family Relationships helpful    Able to Return to Prior Arrangements yes    Living Arrangement Comments Pt states she feels safe/comfortable going home.       Transition Planning    Patient/Family Anticipates Transition to home  wants to resume Religion  services for feet wounds/issues    Patient/Family Anticipated Services at Transition home health care       Discharge Needs Assessment    Readmission Within the Last 30 Days no previous admission in last 30 days    Equipment Currently Used at Home none    Concerns to be Addressed no discharge needs identified    Equipment Needed After Discharge none    Provided Post Acute Provider List? N/A    Provided Post Acute Provider Quality & Resource List? N/A               Discharge Plan     Row Name 09/03/22 0869       Plan    Plan Spoke to pt in room. Pt very drowsy. SW asked if pt felt safe to go back home at discharge and pt stated she did.  At discharge Janie or Jose Alberton will transport her home. Pt states she has a car and denies any transportation concerns.   Pt currently has oxygen in place and if ordered at DC will be a new set up.  Pt denied any barriers to discharge.  SW left contact information in room in case pt needs to speak with someone prior to discharge. Pt would like [facility] to be set back up at discharge.    Patient/Family in Agreement with Plan yes    Provided Post Acute Provider List? N/A    Provided Post Acute Provider Quality &  Resource List? N/A              Continued Care and Services - Admitted Since 9/2/2022    Coordination has not been started for this encounter.          Demographic Summary     Row Name 09/03/22 0943       General Information    Admission Type observation    Arrived From home    Referral Source admission list    Reason for Consult discharge planning    Preferred Language English       Contact Information    Permission Granted to Share Info With     Contact Information Obtained for                Functional Status    No documentation.                Psychosocial     Row Name 09/03/22 0944       Values/Beliefs    Spiritual, Cultural Beliefs, Denominational Practices, Values that Affect Care no       Emotion Mood WDL    Emotion/Mood/Affect WDL WDL       Speech WDL    Speech WDL WDL       Thought Process WDL    Thought Process WDL WDL       Coping/Stress    Major Change/Loss/Stressor none    Patient Personal Strengths assertive    Sources of Support sibling(s);other family members;friend(s)    Reaction to Health Status unable to assess    Understanding of Condition and Treatment unable to assess       Developmental Stage (Eriksson's)    Developmental Stage Stage 7 (35-65 years/Middle Adulthood) Generativity vs. Stagnation       C-SSRS (Recent)    Q1 Wished to be Dead (Past Month) no    Q2 Suicidal Thoughts (Past Month) no    Q6 Suicide Behavior (Lifetime) no       Violence Risk    Feels Like Hurting Others no    Previous Attempt to Harm Others no               Abuse/Neglect    No documentation.                Legal    No documentation.                Substance Abuse    No documentation.                Patient Forms    No documentation.                   TEODORA MOTA

## 2022-09-03 NOTE — PROGRESS NOTES
"Pharmacy Consult - Enoxaparin Dosing    Cait Lu is a 62 y.o. female who has been consulted to dose enoxaparin for VTE prophylaxis.     Allergies    Patient has no known allergies.    Relevant clinical data and objective history reviewed:     [Ht: 162.6 cm (64\"); Wt: 83.9 kg (184 lb 15.5 oz)]  Body mass index is 31.75 kg/m².    Estimated Creatinine Clearance: 19.3 mL/min (A) (by C-G formula based on SCr of 3.12 mg/dL (H)).    Results from last 7 days   Lab Units 09/02/22 2003 09/02/22  1841   HEMOGLOBIN g/dL 9.5*  --    HEMATOCRIT % 30.4*  --    PLATELETS 10*3/mm3 178  --    CREATININE mg/dL  --  3.12*       Asessment/Plan    Initiate Enoxaparin 30 mg SQ every 24 hours  Pharmacy will monitor Ms. Lu's renal function and clinical status and adjust the enoxaparin dose and/or frequency as needed.    Thank you,    Ester Neely RP, PharmD  9/2/2022  23:58 EDT      "

## 2022-09-04 ENCOUNTER — APPOINTMENT (OUTPATIENT)
Dept: CARDIOLOGY | Facility: HOSPITAL | Age: 62
End: 2022-09-04

## 2022-09-04 LAB
ANION GAP SERPL CALCULATED.3IONS-SCNC: 6.6 MMOL/L (ref 5–15)
BH CV ECHO MEAS - AO MAX PG: 8 MMHG
BH CV ECHO MEAS - AO MEAN PG: 4 MMHG
BH CV ECHO MEAS - AO ROOT DIAM: 2.9 CM
BH CV ECHO MEAS - AO V2 MAX: 141 CM/SEC
BH CV ECHO MEAS - AO V2 VTI: 28.7 CM
BH CV ECHO MEAS - AVA(I,D): 1.58 CM2
BH CV ECHO MEAS - EDV(CUBED): 88.1 ML
BH CV ECHO MEAS - EDV(MOD-SP2): 74.4 ML
BH CV ECHO MEAS - EDV(MOD-SP4): 83.4 ML
BH CV ECHO MEAS - EF(MOD-BP): 74.7 %
BH CV ECHO MEAS - EF(MOD-SP2): 70.6 %
BH CV ECHO MEAS - EF(MOD-SP4): 78.4 %
BH CV ECHO MEAS - ESV(CUBED): 18.8 ML
BH CV ECHO MEAS - ESV(MOD-SP2): 21.9 ML
BH CV ECHO MEAS - ESV(MOD-SP4): 18 ML
BH CV ECHO MEAS - FS: 40.2 %
BH CV ECHO MEAS - IVS/LVPW: 1.19 CM
BH CV ECHO MEAS - IVSD: 1.02 CM
BH CV ECHO MEAS - LA DIMENSION: 3 CM
BH CV ECHO MEAS - LAT PEAK E' VEL: 9.7 CM/SEC
BH CV ECHO MEAS - LV DIASTOLIC VOL/BSA (35-75): 43.6 CM2
BH CV ECHO MEAS - LV MASS(C)D: 138.3 GRAMS
BH CV ECHO MEAS - LV MAX PG: 3.7 MMHG
BH CV ECHO MEAS - LV MEAN PG: 1 MMHG
BH CV ECHO MEAS - LV SYSTOLIC VOL/BSA (12-30): 9.4 CM2
BH CV ECHO MEAS - LV V1 MAX: 96.1 CM/SEC
BH CV ECHO MEAS - LV V1 VTI: 15.5 CM
BH CV ECHO MEAS - LVIDD: 4.5 CM
BH CV ECHO MEAS - LVIDS: 2.7 CM
BH CV ECHO MEAS - LVOT AREA: 2.9 CM2
BH CV ECHO MEAS - LVOT DIAM: 1.93 CM
BH CV ECHO MEAS - LVPWD: 0.86 CM
BH CV ECHO MEAS - MED PEAK E' VEL: 7.4 CM/SEC
BH CV ECHO MEAS - MV A MAX VEL: 81.5 CM/SEC
BH CV ECHO MEAS - MV DEC TIME: 0.21 MSEC
BH CV ECHO MEAS - MV E MAX VEL: 67.9 CM/SEC
BH CV ECHO MEAS - MV E/A: 0.83
BH CV ECHO MEAS - MV MAX PG: 4.2 MMHG
BH CV ECHO MEAS - MV MEAN PG: 2 MMHG
BH CV ECHO MEAS - MV V2 VTI: 24.7 CM
BH CV ECHO MEAS - MVA(VTI): 1.84 CM2
BH CV ECHO MEAS - PA ACC TIME: 0.1 SEC
BH CV ECHO MEAS - PA PR(ACCEL): 36.3 MMHG
BH CV ECHO MEAS - PA V2 MAX: 109 CM/SEC
BH CV ECHO MEAS - RAP SYSTOLE: 3 MMHG
BH CV ECHO MEAS - RVSP: 28.2 MMHG
BH CV ECHO MEAS - SI(MOD-SP2): 27.4 ML/M2
BH CV ECHO MEAS - SI(MOD-SP4): 34.2 ML/M2
BH CV ECHO MEAS - SV(LVOT): 45.3 ML
BH CV ECHO MEAS - SV(MOD-SP2): 52.5 ML
BH CV ECHO MEAS - SV(MOD-SP4): 65.4 ML
BH CV ECHO MEAS - TAPSE (>1.6): 2.7 CM
BH CV ECHO MEAS - TR MAX PG: 25.2 MMHG
BH CV ECHO MEAS - TR MAX VEL: 251 CM/SEC
BH CV ECHO MEASUREMENTS AVERAGE E/E' RATIO: 7.94
BH CV XLRA - RV BASE: 4.5 CM
BH CV XLRA - TDI S': 15.4 CM/SEC
BUN SERPL-MCNC: 16 MG/DL (ref 8–23)
BUN/CREAT SERPL: 10.1 (ref 7–25)
CALCIUM SPEC-SCNC: 9.5 MG/DL (ref 8.6–10.5)
CHLORIDE SERPL-SCNC: 106 MMOL/L (ref 98–107)
CK SERPL-CCNC: 325 U/L (ref 20–180)
CO2 SERPL-SCNC: 26.4 MMOL/L (ref 22–29)
CREAT SERPL-MCNC: 1.59 MG/DL (ref 0.57–1)
EGFRCR SERPLBLD CKD-EPI 2021: 36.4 ML/MIN/1.73
GLUCOSE SERPL-MCNC: 93 MG/DL (ref 65–99)
LEFT ATRIUM VOLUME INDEX: 24.1 ML/M2
LV EF 2D ECHO EST: 78 %
MAXIMAL PREDICTED HEART RATE: 157 BPM
POTASSIUM SERPL-SCNC: 4.6 MMOL/L (ref 3.5–5.2)
SODIUM SERPL-SCNC: 139 MMOL/L (ref 136–145)
STRESS TARGET HR: 133 BPM

## 2022-09-04 PROCEDURE — 93306 TTE W/DOPPLER COMPLETE: CPT

## 2022-09-04 PROCEDURE — 25010000002 ENOXAPARIN PER 10 MG: Performed by: INTERNAL MEDICINE

## 2022-09-04 PROCEDURE — 94762 N-INVAS EAR/PLS OXIMTRY CONT: CPT

## 2022-09-04 PROCEDURE — 94761 N-INVAS EAR/PLS OXIMETRY MLT: CPT

## 2022-09-04 PROCEDURE — G0378 HOSPITAL OBSERVATION PER HR: HCPCS

## 2022-09-04 PROCEDURE — 96361 HYDRATE IV INFUSION ADD-ON: CPT

## 2022-09-04 PROCEDURE — 94799 UNLISTED PULMONARY SVC/PX: CPT

## 2022-09-04 PROCEDURE — 82550 ASSAY OF CK (CPK): CPT | Performed by: STUDENT IN AN ORGANIZED HEALTH CARE EDUCATION/TRAINING PROGRAM

## 2022-09-04 PROCEDURE — 25010000002 FUROSEMIDE PER 20 MG: Performed by: STUDENT IN AN ORGANIZED HEALTH CARE EDUCATION/TRAINING PROGRAM

## 2022-09-04 PROCEDURE — 93306 TTE W/DOPPLER COMPLETE: CPT | Performed by: INTERNAL MEDICINE

## 2022-09-04 PROCEDURE — 94760 N-INVAS EAR/PLS OXIMETRY 1: CPT

## 2022-09-04 PROCEDURE — 99225 PR SBSQ OBSERVATION CARE/DAY 25 MINUTES: CPT | Performed by: STUDENT IN AN ORGANIZED HEALTH CARE EDUCATION/TRAINING PROGRAM

## 2022-09-04 PROCEDURE — 96375 TX/PRO/DX INJ NEW DRUG ADDON: CPT

## 2022-09-04 PROCEDURE — 96372 THER/PROPH/DIAG INJ SC/IM: CPT

## 2022-09-04 PROCEDURE — 80048 BASIC METABOLIC PNL TOTAL CA: CPT | Performed by: STUDENT IN AN ORGANIZED HEALTH CARE EDUCATION/TRAINING PROGRAM

## 2022-09-04 RX ORDER — ENOXAPARIN SODIUM 100 MG/ML
40 INJECTION SUBCUTANEOUS EVERY 24 HOURS
Status: DISCONTINUED | OUTPATIENT
Start: 2022-09-05 | End: 2022-09-05 | Stop reason: HOSPADM

## 2022-09-04 RX ORDER — FUROSEMIDE 10 MG/ML
40 INJECTION INTRAMUSCULAR; INTRAVENOUS ONCE
Status: COMPLETED | OUTPATIENT
Start: 2022-09-04 | End: 2022-09-04

## 2022-09-04 RX ADMIN — SENNOSIDES AND DOCUSATE SODIUM 2 TABLET: 50; 8.6 TABLET ORAL at 21:02

## 2022-09-04 RX ADMIN — BUDESONIDE 0.5 MG: 0.5 SUSPENSION RESPIRATORY (INHALATION) at 19:48

## 2022-09-04 RX ADMIN — OXYCODONE HYDROCHLORIDE AND ACETAMINOPHEN 1 TABLET: 7.5; 325 TABLET ORAL at 21:03

## 2022-09-04 RX ADMIN — ENOXAPARIN SODIUM 30 MG: 40 INJECTION SUBCUTANEOUS at 05:00

## 2022-09-04 RX ADMIN — QUETIAPINE FUMARATE 300 MG: 100 TABLET ORAL at 21:02

## 2022-09-04 RX ADMIN — FUROSEMIDE 40 MG: 10 INJECTION, SOLUTION INTRAMUSCULAR; INTRAVENOUS at 14:26

## 2022-09-04 RX ADMIN — GABAPENTIN 600 MG: 300 CAPSULE ORAL at 05:00

## 2022-09-04 RX ADMIN — OXYCODONE HYDROCHLORIDE AND ACETAMINOPHEN 1 TABLET: 7.5; 325 TABLET ORAL at 04:57

## 2022-09-04 RX ADMIN — IPRATROPIUM BROMIDE AND ALBUTEROL SULFATE 3 ML: .5; 3 SOLUTION RESPIRATORY (INHALATION) at 19:48

## 2022-09-04 RX ADMIN — PANTOPRAZOLE SODIUM 40 MG: 40 TABLET, DELAYED RELEASE ORAL at 05:01

## 2022-09-04 RX ADMIN — GABAPENTIN 600 MG: 300 CAPSULE ORAL at 21:02

## 2022-09-04 RX ADMIN — BUDESONIDE 0.5 MG: 0.5 SUSPENSION RESPIRATORY (INHALATION) at 06:54

## 2022-09-04 RX ADMIN — GABAPENTIN 600 MG: 300 CAPSULE ORAL at 14:26

## 2022-09-04 RX ADMIN — Medication 3 ML: at 21:44

## 2022-09-04 RX ADMIN — BUPRENORPHINE AND NALOXONE 2 FILM: 8; 2 FILM, SOLUBLE BUCCAL; SUBLINGUAL at 09:11

## 2022-09-04 RX ADMIN — IPRATROPIUM BROMIDE AND ALBUTEROL SULFATE 3 ML: .5; 3 SOLUTION RESPIRATORY (INHALATION) at 06:54

## 2022-09-04 RX ADMIN — IPRATROPIUM BROMIDE AND ALBUTEROL SULFATE 3 ML: .5; 3 SOLUTION RESPIRATORY (INHALATION) at 12:58

## 2022-09-04 RX ADMIN — SENNOSIDES AND DOCUSATE SODIUM 2 TABLET: 50; 8.6 TABLET ORAL at 09:11

## 2022-09-04 RX ADMIN — SODIUM CHLORIDE 150 ML/HR: 9 INJECTION, SOLUTION INTRAVENOUS at 05:48

## 2022-09-04 RX ADMIN — ACETAMINOPHEN 650 MG: 325 TABLET ORAL at 17:46

## 2022-09-04 RX ADMIN — BUPROPION HYDROCHLORIDE 150 MG: 150 TABLET, EXTENDED RELEASE ORAL at 09:11
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
## 2022-09-05 VITALS
TEMPERATURE: 98.8 F | HEIGHT: 64 IN | DIASTOLIC BLOOD PRESSURE: 49 MMHG | RESPIRATION RATE: 16 BRPM | BODY MASS INDEX: 31.35 KG/M2 | HEART RATE: 98 BPM | WEIGHT: 183.64 LBS | SYSTOLIC BLOOD PRESSURE: 92 MMHG | OXYGEN SATURATION: 91 %

## 2022-09-05 LAB
ALBUMIN SERPL-MCNC: 3.4 G/DL (ref 3.5–5.2)
ALP SERPL-CCNC: 75 U/L (ref 39–117)
ALT SERPL W P-5'-P-CCNC: 8 U/L (ref 1–33)
AMMONIA BLD-SCNC: 22 UMOL/L (ref 11–51)
ANION GAP SERPL CALCULATED.3IONS-SCNC: 6.1 MMOL/L (ref 5–15)
AST SERPL-CCNC: 13 U/L (ref 1–32)
BILIRUB CONJ SERPL-MCNC: <0.2 MG/DL (ref 0–0.3)
BILIRUB INDIRECT SERPL-MCNC: ABNORMAL MG/DL
BILIRUB SERPL-MCNC: 0.2 MG/DL (ref 0–1.2)
BUN SERPL-MCNC: 20 MG/DL (ref 8–23)
BUN/CREAT SERPL: 15.2 (ref 7–25)
CALCIUM SPEC-SCNC: 9.3 MG/DL (ref 8.6–10.5)
CHLORIDE SERPL-SCNC: 101 MMOL/L (ref 98–107)
CO2 SERPL-SCNC: 31.9 MMOL/L (ref 22–29)
CREAT SERPL-MCNC: 1.32 MG/DL (ref 0.57–1)
EGFRCR SERPLBLD CKD-EPI 2021: 45.5 ML/MIN/1.73
GLUCOSE SERPL-MCNC: 99 MG/DL (ref 65–99)
POTASSIUM SERPL-SCNC: 4.4 MMOL/L (ref 3.5–5.2)
PROT SERPL-MCNC: 6.3 G/DL (ref 6–8.5)
SODIUM SERPL-SCNC: 139 MMOL/L (ref 136–145)

## 2022-09-05 PROCEDURE — 80076 HEPATIC FUNCTION PANEL: CPT | Performed by: INTERNAL MEDICINE

## 2022-09-05 PROCEDURE — 96372 THER/PROPH/DIAG INJ SC/IM: CPT

## 2022-09-05 PROCEDURE — 25010000002 ENOXAPARIN PER 10 MG: Performed by: INTERNAL MEDICINE

## 2022-09-05 PROCEDURE — 94799 UNLISTED PULMONARY SVC/PX: CPT

## 2022-09-05 PROCEDURE — 94618 PULMONARY STRESS TESTING: CPT

## 2022-09-05 PROCEDURE — 94664 DEMO&/EVAL PT USE INHALER: CPT

## 2022-09-05 PROCEDURE — 94761 N-INVAS EAR/PLS OXIMETRY MLT: CPT

## 2022-09-05 PROCEDURE — 82140 ASSAY OF AMMONIA: CPT | Performed by: INTERNAL MEDICINE

## 2022-09-05 PROCEDURE — G0378 HOSPITAL OBSERVATION PER HR: HCPCS

## 2022-09-05 PROCEDURE — 99217 PR OBSERVATION CARE DISCHARGE MANAGEMENT: CPT | Performed by: INTERNAL MEDICINE

## 2022-09-05 PROCEDURE — 94660 CPAP INITIATION&MGMT: CPT

## 2022-09-05 PROCEDURE — 80048 BASIC METABOLIC PNL TOTAL CA: CPT | Performed by: STUDENT IN AN ORGANIZED HEALTH CARE EDUCATION/TRAINING PROGRAM

## 2022-09-05 RX ORDER — FUROSEMIDE 20 MG/1
20 TABLET ORAL DAILY
Qty: 10 TABLET | Refills: 0 | Status: SHIPPED | OUTPATIENT
Start: 2022-09-06

## 2022-09-05 RX ORDER — POTASSIUM CHLORIDE 750 MG/1
10 CAPSULE, EXTENDED RELEASE ORAL DAILY
Qty: 10 CAPSULE | Refills: 0 | Status: SHIPPED | OUTPATIENT
Start: 2022-09-06

## 2022-09-05 RX ORDER — FUROSEMIDE 20 MG/1
20 TABLET ORAL DAILY
Status: DISCONTINUED | OUTPATIENT
Start: 2022-09-05 | End: 2022-09-05 | Stop reason: HOSPADM

## 2022-09-05 RX ORDER — POTASSIUM CHLORIDE 750 MG/1
10 CAPSULE, EXTENDED RELEASE ORAL DAILY
Status: DISCONTINUED | OUTPATIENT
Start: 2022-09-05 | End: 2022-09-05 | Stop reason: HOSPADM

## 2022-09-05 RX ADMIN — IPRATROPIUM BROMIDE AND ALBUTEROL SULFATE 3 ML: .5; 3 SOLUTION RESPIRATORY (INHALATION) at 06:55

## 2022-09-05 RX ADMIN — Medication 3 ML: at 09:50

## 2022-09-05 RX ADMIN — BUPRENORPHINE AND NALOXONE 2 FILM: 8; 2 FILM, SOLUBLE BUCCAL; SUBLINGUAL at 09:41

## 2022-09-05 RX ADMIN — SENNOSIDES AND DOCUSATE SODIUM 2 TABLET: 50; 8.6 TABLET ORAL at 09:42

## 2022-09-05 RX ADMIN — BUDESONIDE 0.5 MG: 0.5 SUSPENSION RESPIRATORY (INHALATION) at 06:55

## 2022-09-05 RX ADMIN — PANTOPRAZOLE SODIUM 40 MG: 40 TABLET, DELAYED RELEASE ORAL at 06:27

## 2022-09-05 RX ADMIN — OXYCODONE HYDROCHLORIDE AND ACETAMINOPHEN 1 TABLET: 7.5; 325 TABLET ORAL at 06:27

## 2022-09-05 RX ADMIN — GABAPENTIN 600 MG: 300 CAPSULE ORAL at 15:03

## 2022-09-05 RX ADMIN — FLUTICASONE PROPIONATE 2 SPRAY: 50 SPRAY, METERED NASAL at 09:47

## 2022-09-05 RX ADMIN — GABAPENTIN 600 MG: 300 CAPSULE ORAL at 06:27

## 2022-09-05 RX ADMIN — ENOXAPARIN SODIUM 40 MG: 40 INJECTION SUBCUTANEOUS at 06:27

## 2022-09-05 NOTE — CASE MANAGEMENT/SOCIAL WORK
Case Management/Social Work    Patient Name:  Cait Lu  YOB: 1960  MRN: [id number]  Admit Date:  9/2/2022    CSW contacted pt to ensure that she is needing a new 02 set-up Pt states that she is and that she does not have a provider preference. CSW contacted Yuval with [facility]. Yuval states that they do take her insurance and that they will be right over with a tank.     Electronically signed by:  Tasha Gallego  09/05/22 12:06 EDT
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
## 2022-09-05 NOTE — PLAN OF CARE
Detail Level: Detailed
Goal Outcome Evaluation:            Pt has been able to transfer to the bedside commode several times throughout shift. Pt is no longer using the walker. Pt was on 2L nasal cannula, is currently on RA. Pt did receive one dose of PRN pain medication.      
Goal Outcome Evaluation:     Pt is being discharged per MD orders.             
Goal Outcome Evaluation:  Plan of Care Reviewed With: patient           Outcome Evaluation: Patient had no acute events today.  
Goal Outcome Evaluation:  Plan of Care Reviewed With: patient        Progress: no change  Outcome Evaluation: No acute events overnight. Patient has rested well. Continue to monitor.  
Goal Outcome Evaluation:  Plan of Care Reviewed With: patient        Progress: no change  Outcome Evaluation: Patient had no acute events today.  
Detail Level: Zone

## 2022-09-05 NOTE — PROGRESS NOTES
Exercise Oximetry    Patient Name:Cait Lu   MRN: [id number]   Date: 09/05/22             ROOM AIR BASELINE   SpO2% 93   Heart Rate 94   Blood Pressure 97/63     EXERCISE ON ROOM AIR SpO2% EXERCISE ON O2 @ 2 LPM SpO2%   1 MINUTE 90 1 MINUTE    2 MINUTES 89 2 MINUTES    3 MINUTES 86 3 MINUTES    4 MINUTES x 4 MINUTES 93   5 MINUTES x 5 MINUTES 93   6 MINUTES x 6 MINUTES 94              In Room Exercise  Distance Walked   Dyspnea (Luciana Scale)  3 Dyspnea (Luciana Scale)   Fatigue (Luciana Scale)  9 Fatigue (Luciana Scale)   SpO2% Post Exercise  94 SpO2% Post Exercise   HR Post Exercise  104 HR Post Exercise   Time to Recovery  none Time to Recovery     Comments: Due to this patient having frequent falls and stating she could not put pressure on her right foot due to a foot injury. We performed in room exercise. No oxygen needs with rest, but 2 liters is needed with movement. Thank  You

## 2022-09-05 NOTE — CASE MANAGEMENT/SOCIAL WORK
Case Management Discharge Note    Selected Continued Care - Admitted Since 9/2/2022         Durable Medical Equipment Coordination complete.    Service Provider Selected Services Address Phone Fax Patient Preferred    [facility] [place]  D[facility] Equipment [address], [place] [id number] [phone] --                   Home Medical Care Coordination complete.    Service Provider Selected Services Address Phone Fax Patient Preferred    [facility]  Home Health Services [address], [facility] [address] [phone] --           Transportation Services  Private: Car    Final Discharge Disposition Code: 01 - home or self-care

## 2022-09-06 ENCOUNTER — READMISSION MANAGEMENT (OUTPATIENT)
Dept: CALL CENTER | Facility: HOSPITAL | Age: 62
End: 2022-09-06

## 2022-09-06 ENCOUNTER — TELEPHONE (OUTPATIENT)
Dept: TELEMETRY | Facility: HOSPITAL | Age: 62
End: 2022-09-06

## 2022-09-06 ENCOUNTER — HOME CARE VISIT (OUTPATIENT)
Dept: HOME HEALTH SERVICES | Facility: HOME HEALTHCARE | Age: 62
End: 2022-09-06

## 2022-09-06 PROCEDURE — G0300 HHS/HOSPICE OF LPN EA 15 MIN: HCPCS
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
## 2022-09-08 VITALS
TEMPERATURE: 97.4 F | SYSTOLIC BLOOD PRESSURE: 122 MMHG | OXYGEN SATURATION: 97 % | RESPIRATION RATE: 16 BRPM | DIASTOLIC BLOOD PRESSURE: 67 MMHG

## 2022-09-09 ENCOUNTER — HOME CARE VISIT (OUTPATIENT)
Dept: HOME HEALTH SERVICES | Facility: HOME HEALTHCARE | Age: 62
End: 2022-09-09

## 2022-09-09 PROCEDURE — G0300 HHS/HOSPICE OF LPN EA 15 MIN: HCPCS

## 2022-09-11 VITALS
DIASTOLIC BLOOD PRESSURE: 78 MMHG | SYSTOLIC BLOOD PRESSURE: 127 MMHG | OXYGEN SATURATION: 97 % | TEMPERATURE: 96.6 F | HEART RATE: 85 BPM | RESPIRATION RATE: 16 BRPM

## 2022-09-13 RX ORDER — POTASSIUM CHLORIDE 750 MG/1
10 CAPSULE, EXTENDED RELEASE ORAL DAILY
Qty: 10 CAPSULE | Refills: 0 | OUTPATIENT
Start: 2022-09-13

## 2022-09-13 RX ORDER — FUROSEMIDE 20 MG/1
20 TABLET ORAL DAILY
Qty: 10 TABLET | Refills: 0 | OUTPATIENT
Start: 2022-09-13

## 2022-09-14 ENCOUNTER — HOME CARE VISIT (OUTPATIENT)
Dept: HOME HEALTH SERVICES | Facility: HOME HEALTHCARE | Age: 62
End: 2022-09-14

## 2022-09-14 PROCEDURE — G0300 HHS/HOSPICE OF LPN EA 15 MIN: HCPCS

## 2022-09-15 VITALS
TEMPERATURE: 97.1 F | RESPIRATION RATE: 18 BRPM | DIASTOLIC BLOOD PRESSURE: 82 MMHG | OXYGEN SATURATION: 98 % | HEART RATE: 82 BPM | SYSTOLIC BLOOD PRESSURE: 122 MMHG

## 2022-09-20 ENCOUNTER — HOME CARE VISIT (OUTPATIENT)
Dept: HOME HEALTH SERVICES | Facility: HOME HEALTHCARE | Age: 62
End: 2022-09-20

## 2022-09-20 PROCEDURE — G0300 HHS/HOSPICE OF LPN EA 15 MIN: HCPCS

## 2022-09-26 ENCOUNTER — HOME CARE VISIT (OUTPATIENT)
Dept: HOME HEALTH SERVICES | Facility: HOME HEALTHCARE | Age: 62
End: 2022-09-26

## 2022-09-26 VITALS
OXYGEN SATURATION: 100 % | HEART RATE: 93 BPM | RESPIRATION RATE: 16 BRPM | TEMPERATURE: 97.1 F | SYSTOLIC BLOOD PRESSURE: 105 MMHG | DIASTOLIC BLOOD PRESSURE: 60 MMHG

## 2022-09-26 PROCEDURE — G0495 RN CARE TRAIN/EDU IN HH: HCPCS

## 2022-09-28 VITALS
TEMPERATURE: 98.9 F | DIASTOLIC BLOOD PRESSURE: 79 MMHG | HEART RATE: 89 BPM | SYSTOLIC BLOOD PRESSURE: 122 MMHG | OXYGEN SATURATION: 96 % | RESPIRATION RATE: 18 BRPM

## 2022-09-28 NOTE — HOME HEALTH
THE FOLLOWING INSTRUCTIONS WERE REVIEWED UPON DISCHARGE:    Keep your appointment with your providers as scheduled.    Call your doctor if you develop a new or worsening symptom, especially if you develop s/s of infection.    Continue to take the medications prescribed by your doctor. A medication list is attached. Be sure to keep them informed of all medications you take including over the counter herbal, vitamins/minerals and the ones you take only when needed.    Follow the prescribed diet as ordered by your doctor.     Instructions given to Patient    Instructions provided per Visit

## 2023-10-16 ENCOUNTER — HOSPITAL ENCOUNTER (EMERGENCY)
Facility: HOSPITAL | Age: 63
Discharge: HOME | End: 2023-10-16
Attending: EMERGENCY MEDICINE | Admitting: EMERGENCY MEDICINE
Payer: COMMERCIAL

## 2023-10-16 VITALS
OXYGEN SATURATION: 97 % | TEMPERATURE: 97.5 F | HEIGHT: 64 IN | BODY MASS INDEX: 32.44 KG/M2 | WEIGHT: 190 LBS | DIASTOLIC BLOOD PRESSURE: 89 MMHG | SYSTOLIC BLOOD PRESSURE: 121 MMHG | HEART RATE: 85 BPM | RESPIRATION RATE: 17 BRPM

## 2023-10-16 DIAGNOSIS — S81.812A LACERATION OF LEFT LOWER EXTREMITY, INITIAL ENCOUNTER: Primary | ICD-10-CM

## 2023-10-16 PROCEDURE — 25010000002 LIDOCAINE 1 % SOLUTION

## 2023-10-16 PROCEDURE — 99282 EMERGENCY DEPT VISIT SF MDM: CPT

## 2023-10-16 RX ORDER — LIDOCAINE HYDROCHLORIDE 10 MG/ML
10 INJECTION, SOLUTION INFILTRATION; PERINEURAL ONCE
Status: COMPLETED | OUTPATIENT
Start: 2023-10-16 | End: 2023-10-16

## 2023-10-16 RX ORDER — CEPHALEXIN 500 MG/1
500 CAPSULE ORAL 2 TIMES DAILY
Qty: 14 CAPSULE | Refills: 0 | Status: SHIPPED | OUTPATIENT
Start: 2023-10-16 | End: 2023-10-16 | Stop reason: SDUPTHER

## 2023-10-16 RX ORDER — CEPHALEXIN 500 MG/1
500 CAPSULE ORAL 2 TIMES DAILY
Qty: 14 CAPSULE | Refills: 0 | Status: SHIPPED | OUTPATIENT
Start: 2023-10-16 | End: 2023-10-23

## 2023-10-16 RX ADMIN — LIDOCAINE HYDROCHLORIDE 10 ML: 10 INJECTION, SOLUTION INFILTRATION; PERINEURAL at 17:52

## 2023-10-16 NOTE — ED PROVIDER NOTES
Subjective  History of Present Illness:    This is a 63-year-old female, history of cellulitis, edema, hypercholesterolemia, hypertension, impaired functional mobility balance and gait, sebaceous cyst, present emergency room today for evaluation of leg laceration.  Patient reports having proximally 45 minutes prior to arrival.  She went to urgent care, they said it was too much to suture there and sent her to the ED for further evaluation.  She is not any anticoagulation.  She reports that she was walking to the sink when she walked next to a plastic box in her home and sliced her leg on this.  She is noted to have a 3 cm U shaped laceration on the distal left lower extremity around the lateral shin.  She is still able to ambulate.  There is no bony tenderness.  She is up-to-date on her vaccinations per her.  Including tetanus.      Nurses Notes reviewed and agree, including vitals, allergies, social history and prior medical history.     REVIEW OF SYSTEMS: All systems reviewed and not pertinent unless noted.  Review of Systems   Skin:         Left lower extremity laceration   All other systems reviewed and are negative.      Past Medical History:   Diagnosis Date    Abdominal pain     Acute bronchitis with bronchospasm     Allergic rhinitis due to pollen     Cellulitis     Cutaneous candidiasis     Edema     Former smoker     Hypercholesterolemia     Hypertension     Impaired functional mobility, balance, gait, and endurance     Joint pain of ankle and foot     Legally blind     Low back pain     Neuralgia     Sebaceous cyst     Toe joint pain     Urinary tract infection, acute     Vitamin D deficiency        Allergies:    Patient has no known allergies.      History reviewed. No pertinent surgical history.      Social History     Socioeconomic History    Marital status:    Tobacco Use    Smoking status: Former     Packs/day: 1.00     Years: 42.00     Additional pack years: 0.00     Total pack years: 42.00  "    Types: Cigarettes     Start date: 1974     Quit date: 2016     Years since quittin.7    Smokeless tobacco: Never   Vaping Use    Vaping Use: Every day   Substance and Sexual Activity    Alcohol use: No    Drug use: No    Sexual activity: Defer         Family History   Problem Relation Age of Onset    Cancer Mother         breast    Diabetes Mother     Early death Mother         age 73    Heart disease Father     Heart disease Other        Objective  Physical Exam:  /89 (BP Location: Left arm, Patient Position: Sitting)   Pulse 85   Temp 97.5 °F (36.4 °C) (Oral)   Resp 17   Ht 162.6 cm (64\")   Wt 86.2 kg (190 lb)   SpO2 97%   BMI 32.61 kg/m²      Physical Exam  Vitals and nursing note reviewed.   Constitutional:       General: She is not in acute distress.     Appearance: Normal appearance. She is obese. She is not toxic-appearing.      Comments: Chronically ill-appearing   HENT:      Head: Normocephalic and atraumatic.      Nose: Nose normal.      Mouth/Throat:      Pharynx: Oropharynx is clear.   Eyes:      Extraocular Movements: Extraocular movements intact.   Cardiovascular:      Pulses: Normal pulses.      Comments: Appears well perfused  Pulmonary:      Effort: Pulmonary effort is normal.   Abdominal:      General: Abdomen is flat.   Musculoskeletal:         General: Normal range of motion.      Cervical back: Normal range of motion.      Right lower leg: Edema present.      Left lower leg: Edema present.        Legs:       Comments: 3 cm u shaped laceration LLE.    Skin:     General: Skin is warm.      Capillary Refill: Capillary refill takes less than 2 seconds.      Comments: There is a 3 cm U shaped laceration about the left lower extremity lateral aspect of the distal shin.  Nonbleeding.   Neurological:      General: No focal deficit present.      Mental Status: She is alert and oriented to person, place, and time.   Psychiatric:         Mood and Affect: Mood normal.         " Behavior: Behavior normal.         Thought Content: Thought content normal.         Judgment: Judgment normal.             Laceration Repair    Date/Time: 10/16/2023 6:30 PM    Performed by: Adrian Milligan PA-C  Authorized by: Yuval Perez DO    Consent:     Consent obtained:  Verbal    Consent given by:  Patient    Risks, benefits, and alternatives were discussed: yes      Risks discussed:  Infection, pain and poor cosmetic result    Alternatives discussed:  No treatment  Universal protocol:     Procedure explained and questions answered to patient or proxy's satisfaction: yes      Patient identity confirmed:  Verbally with patient  Anesthesia:     Anesthesia method:  Local infiltration    Local anesthetic:  Lidocaine 1% w/o epi  Laceration details:     Location: left lower extremity.    Length (cm):  3  Exploration:     Limited defect created (wound extended): no      Wound exploration: wound explored through full range of motion and entire depth of wound visualized      Wound extent: no foreign bodies/material noted and no muscle damage noted      Contaminated: no    Treatment:     Area cleansed with:  Chlorhexidine    Amount of cleaning:  Extensive    Irrigation solution:  Sterile water    Irrigation volume:  500    Irrigation method:  Pressure wash    Visualized foreign bodies/material removed: no      Debridement:  None    Undermining:  None    Scar revision: no      Layers repaired: dermal.  Skin repair:     Repair method:  Sutures    Suture size:  3-0    Suture material:  Nylon    Suture technique:  Simple interrupted    Number of sutures:  19  Approximation:     Approximation:  Close  Repair type:     Repair type:  Simple  Post-procedure details:     Dressing:  Non-adherent dressing    Procedure completion:  Tolerated well, no immediate complications      ED Course:         Lab Results (last 24 hours)       ** No results found for the last 24 hours. **             No radiology results from the last  24 hrs       MDM      Initial impression of presenting illness: This is a 63-year-old female present emergency room today for evaluation of laceration.  She reports that she is up-to-date on her tetanus has not had 1 within the last 10 years.  She denies any plastic breaking off of the tote and it was intact.  Given this, low concern for foreign body.    DDX: includes but is not limited to: Laceration, abrasion, contusion, other    Patient arrives dynamically stable afebrile nontachycardic nonhypoxic and nontoxic-appearing with vitals interpreted by myself.     Pertinent features from physical exam: 3 cm U shaped laceration on the left lower extremity.  The laceration does have some subcutaneous fat exposed.  There is no obvious foreign bodies.  There is bilateral lower extremity edema which is chronic to the patient.  Neurovascular intact bilaterally with 2+ pedal pulses.  Laceration is nonbleeding.  There is no bony tenderness to palpation.  She is still ambulatory.    Initial diagnostic plan: Do not believe any imaging or labs are necessary at this time.    Results from initial plan were reviewed and interpreted by me revealing NA    Diagnostic information from other sources: Old record reviewed    Interventions / Re-evaluation: Lidocaine 1% without epinephrine ordered to bedside.  Area was first cleansed with chlorhexidine, anesthetized locally with lidocaine 1% without epinephrine.  It was next irrigated extensively with 500 cc of sterile water.  Closed with 3-0 nylon suture.  19 total sutures placed, wound was approximated well.  Nonocclusive dressing placed over top.  Stable for discharge.    Results/clinical rationale were discussed with patient at bedside    Consultations/Discussion of results with other physicians: N/A    Disposition plan: Discharge.  Primary care follow-up, return precautions given.  Counseled on signs and symptoms of wound infection.  Given Keflex for prophylaxis of wound infection given  the extent of the wound.  -----    Final diagnoses:   Laceration of left lower extremity, initial encounter          Adrian Milligan PA-C  10/16/23 [id number]

## 2023-12-05 ENCOUNTER — TELEPHONE (OUTPATIENT)
Dept: GASTROENTEROLOGY | Facility: CLINIC | Age: 63
End: 2023-12-05
Payer: COMMERCIAL

## 2023-12-05 NOTE — TELEPHONE ENCOUNTER
Called patient to follow-up on missed appointment; LVM.  If patient returns call, okay for HUB to R/S.

## 2024-03-01 NOTE — OUTREACH NOTE
Medical Week 2 Survey      Responses   [facility] patient discharged from?  [facility]   Does the patient have one of the following disease processes/diagnoses(primary or secondary)?  Other   Week 2 attempt successful?  Yes   Call start time  1709   Discharge diagnosis  Polysubstance abuse Suboxone withdrawal with severe altered mental status   Call end time  1710   Is the patient taking all medications as directed (includes completed medication regime)?  Yes   Does the patient have a primary care provider?   Yes   Does the patient have an appointment with their PCP within 7 days of discharge?  Yes   Comments regarding PCP  recently saw her PCP on 3/12/21   Has the patient kept scheduled appointments due by today?  Yes   What is the patient's perception of their health status since discharge?  Improving   Is the patient/caregiver able to teach back the hierarchy of who to call/visit for symptoms/problems? PCP, Specialist, Home health nurse, Urgent Care, ED, 911  Yes   Week 2 Call Completed?  Yes   Wrap up additional comments  States she is doing well, she recently saw her PCP, no questions or concerns at this time.          Jacqui Ramírez RN  
details…

## 2024-04-01 ENCOUNTER — TRANSCRIBE ORDERS (OUTPATIENT)
Dept: ADMINISTRATIVE | Facility: HOSPITAL | Age: 64
End: 2024-04-01
Payer: COMMERCIAL

## 2024-04-01 DIAGNOSIS — R51.9 NONINTRACTABLE HEADACHE, UNSPECIFIED CHRONICITY PATTERN, UNSPECIFIED HEADACHE TYPE: Primary | ICD-10-CM
